# Patient Record
Sex: MALE | Race: BLACK OR AFRICAN AMERICAN | NOT HISPANIC OR LATINO | URBAN - METROPOLITAN AREA
[De-identification: names, ages, dates, MRNs, and addresses within clinical notes are randomized per-mention and may not be internally consistent; named-entity substitution may affect disease eponyms.]

---

## 2018-12-21 ENCOUNTER — EMERGENCY (EMERGENCY)
Facility: HOSPITAL | Age: 33
LOS: 0 days | Discharge: HOME | End: 2018-12-21
Admitting: PHYSICIAN ASSISTANT

## 2018-12-21 VITALS
TEMPERATURE: 97 F | SYSTOLIC BLOOD PRESSURE: 127 MMHG | DIASTOLIC BLOOD PRESSURE: 77 MMHG | HEART RATE: 78 BPM | OXYGEN SATURATION: 99 % | RESPIRATION RATE: 18 BRPM

## 2018-12-21 DIAGNOSIS — M25.522 PAIN IN LEFT ELBOW: ICD-10-CM

## 2018-12-21 DIAGNOSIS — Z79.2 LONG TERM (CURRENT) USE OF ANTIBIOTICS: ICD-10-CM

## 2018-12-21 DIAGNOSIS — N39.0 URINARY TRACT INFECTION, SITE NOT SPECIFIED: ICD-10-CM

## 2018-12-21 DIAGNOSIS — A64 UNSPECIFIED SEXUALLY TRANSMITTED DISEASE: ICD-10-CM

## 2018-12-21 RX ORDER — CEFTRIAXONE 500 MG/1
250 INJECTION, POWDER, FOR SOLUTION INTRAMUSCULAR; INTRAVENOUS ONCE
Qty: 0 | Refills: 0 | Status: COMPLETED | OUTPATIENT
Start: 2018-12-21 | End: 2018-12-21

## 2018-12-21 RX ORDER — AZITHROMYCIN 500 MG/1
1 TABLET, FILM COATED ORAL ONCE
Qty: 0 | Refills: 0 | Status: COMPLETED | OUTPATIENT
Start: 2018-12-21 | End: 2018-12-21

## 2018-12-21 RX ORDER — IBUPROFEN 200 MG
800 TABLET ORAL ONCE
Qty: 0 | Refills: 0 | Status: COMPLETED | OUTPATIENT
Start: 2018-12-21 | End: 2018-12-21

## 2018-12-21 RX ORDER — MOXIFLOXACIN HYDROCHLORIDE TABLETS, 400 MG 400 MG/1
1 TABLET, FILM COATED ORAL
Qty: 10 | Refills: 0
Start: 2018-12-21 | End: 2018-12-25

## 2018-12-21 RX ADMIN — AZITHROMYCIN 1 GRAM(S): 500 TABLET, FILM COATED ORAL at 20:45

## 2018-12-21 RX ADMIN — Medication 800 MILLIGRAM(S): at 20:45

## 2018-12-21 RX ADMIN — CEFTRIAXONE 250 MILLIGRAM(S): 500 INJECTION, POWDER, FOR SOLUTION INTRAMUSCULAR; INTRAVENOUS at 20:46

## 2018-12-21 NOTE — ED PROVIDER NOTE - OBJECTIVE STATEMENT
34 yo male, pmh of sti as an adolescent, presents to the ed for left elbow pain and tingling with urination. Pt reports elbow pain started six days ago, has not taken anything for pain, is worse with movement, denies trauma. Pt reports unprotected sex with female partner four days ago; states felt tingling with urine this morning. Denies fever, chills, cp, sob, visual changes, trauma, fall, nvd, abd pain, back pain, neck pain. 34 yo male, pmh of sti as an adolescent, presents to the ed for left elbow pain and tingling with urination.   Pt reports elbow pain started six days ago, has not taken anything for pain, is worse with movement, denies trauma.   Pt reports unprotected sex with female partner four days ago; states felt tingling with urine this morning.   Denies fever, chills, cp, sob, visual changes, trauma, fall, nvd, abd pain, back pain, neck pain.  Denies fever/chill/HA/dizziness/chest pain/palpitation/sob/abd pain/n/v/d/ black stool/bloody stool

## 2018-12-21 NOTE — ED PROVIDER NOTE - PROGRESS NOTE DETAILS
I supervised PA fellow care  STD testing benefits/limits discussed with patients.  Partner testing and treatment advised.  STDs may be present without symptoms d/w pt.  Early testing after exposure limits d/w pt.  Pt denies high-risk exposure in past 72 hours. Empiric tx offered/accepted. Pt aware of potentially infectious and agrees to no sex until all results known consistent with urinary tract infection.  no evidence of pyelo.  cx pending d/w pt.  pt aware needs immediate f/u if new or worse symptoms.  limits of abx dw pt. pt aware of need to return if back pain or fever.  pt aware of limitations of abx and potential need to change abx based on cx or new/worse sx.  potential s/e of meds explained : cdfiff/qtp/tendonitis/tendon rupture.  advised to d/c use if any arrythmia/chest pain/palpitations and return to ER   if any other intolerable s/e dc use and rto for further assesment. Recommneded taking probiotics Discussed rest, ice, compression, and elevation with patient.   Discussed NSAIDs for anti-inflammatory and pain relief.  Patient advised to f/u with ortho

## 2018-12-21 NOTE — ED PROVIDER NOTE - NSFOLLOWUPINSTRUCTIONS_ED_ALL_ED_FT
Urinary Tract Infection    A urinary tract infection (UTI) is an infection of any part of the urinary tract, which includes the kidneys, ureters, bladder, and urethra. Risk factors include ignoring your need to urinate, wiping back to front if female, being an uncircumcised male, and having diabetes or a weak immune system. Symptoms include frequent urination, pain or burning with urination, foul smelling urine, cloudy urine, pain in the lower abdomen, blood in the urine, and fever. If you were prescribed an antibiotic medicine, take it as told by your health care provider. Do not stop taking the antibiotic even if you start to feel better.    SEEK IMMEDIATE MEDICAL CARE IF YOU HAVE ANY OF THE FOLLOWING SYMPTOMS: severe back or abdominal pain, fever, inability to keep fluids or medicine down, dizziness/lightheadedness, or a change in mental status.     A sexually transmitted disease (STD) is a disease or infection that may be passed (transmitted) from person to person, usually during sexual activity. This may happen by way of saliva, semen, blood, vaginal mucus, or urine. Symptoms vary depending on the type of STD acquired and may include pain in the groin, discharge, and lesions or a rash. If you are started on an antibiotic, take it exactly as instructed. Avoid sexual contact of any kind until cleared by a health care professional. Contact your sexual partner(s) to inform them of your diagnosis so that they may be tested and treated as well.    SEEK IMMEDIATE MEDICAL CARE IF YOU HAVE ANY OF THE FOLLOWING SYMPTOMS: severe abdominal pain, high fever, nausea/vomiting, or unintended weight loss.

## 2018-12-21 NOTE — ED PROVIDER NOTE - NSFOLLOWUPCLINICS_GEN_ALL_ED_FT
[facility]  Urology  .  [place]   Phone: [phone]  Fax:   Follow Up Time:

## 2018-12-21 NOTE — ED PROVIDER NOTE - CARE PROVIDER_API CALL
Jeramie Lee (MD), Orthopaedic Surgery  [facility] [place] [address]  Phone: [phone]  Fax: [phone]

## 2018-12-21 NOTE — ED PROVIDER NOTE - NS ED ROS FT
Constitutional: (-) fever, (-) chills,  Eyes: (-) visual changes, (-) pain, (-) redness,    Cardiovascular: (-) chest pain, (-) syncope  Respiratory: (-) cough, (-) shortness of breath, (-) dyspnea,   Gastrointestinal: (-) vomiting, (-) diarrhea, (-)nausea  Musculoskeletal: (-) neck pain, (-) back pain, (+) left elbow pain  Integumentary: (-) rash,   Neurological: (-) headache,  Peripheral Vascular: (-) pain while walking, (-) leg swelling, (-) color changes  : (+) tingling with urination, (-)dysuria, (-) discharge/lesions Constitutional: (-) fever, (-) chills,  Eyes: (-) visual changes, (-) pain, (-) redness,    Cardiovascular: (-) chest pain, (-) syncope  Respiratory: (-) cough, (-) shortness of breath, (-) dyspnea,   Gastrointestinal: (-) vomiting, (-) diarrhea, (-)nausea  Musculoskeletal: (-) neck pain, (-) back pain, (+) left elbow pain  Integumentary: (-) rash,   Neurological: (-) headache,  Peripheral Vascular: (-) pain while walking, (-) leg swelling, (-) color changes  : (+) "tingling" with urination, (-)dysuria, (-) discharge/lesions

## 2018-12-21 NOTE — ED PROVIDER NOTE - PHYSICAL EXAMINATION
Physical Exam    Vital Signs: I have reviewed the initial vital signs.  Constitutional: well-nourished, appears stated age, no acute distress  Eyes: Conjunctiva pink, Sclera clear, PERRLA, EOMI.  Cardiovascular: S1 and S2, regular rate, regular rhythm, well-perfused extremities, radial pulses equal and 2+  Respiratory: unlabored respiratory effort, clear to auscultation bilaterally no wheezing, rales and rhonchi  Gastrointestinal: soft, non-tender abdomen, no pulsatile mass, normal bowl sounds, no cva ttp.  Musculoskeletal: supple neck, no lower extremity edema, no midline tenderness, left elbow slightly swollen with normal rom, no ttp over left elbow.   Integumentary: warm, dry, no rash  : Circumcised penis, no lesions, no discharge, testes palpated without pain, no varicocele/hydrocele, no ttp of epididymis.

## 2018-12-21 NOTE — ED ADULT TRIAGE NOTE - CHIEF COMPLAINT QUOTE
pt complaining of left arm pain and elbow pain denies trauma. pt requesting STD testing complaining upon burning upon urination unprotected sex 3 days ago.

## 2018-12-22 LAB
CULTURE RESULTS: SIGNIFICANT CHANGE UP
SPECIMEN SOURCE: SIGNIFICANT CHANGE UP

## 2018-12-24 LAB
C TRACH RRNA SPEC QL NAA+PROBE: SIGNIFICANT CHANGE UP
N GONORRHOEA RRNA SPEC QL NAA+PROBE: SIGNIFICANT CHANGE UP
SPECIMEN SOURCE: SIGNIFICANT CHANGE UP

## 2019-07-08 ENCOUNTER — EMERGENCY (EMERGENCY)
Facility: HOSPITAL | Age: 34
LOS: 0 days | Discharge: HOME | End: 2019-07-08
Attending: EMERGENCY MEDICINE | Admitting: EMERGENCY MEDICINE
Payer: MEDICAID

## 2019-07-08 VITALS
SYSTOLIC BLOOD PRESSURE: 114 MMHG | TEMPERATURE: 98 F | HEART RATE: 69 BPM | OXYGEN SATURATION: 99 % | RESPIRATION RATE: 16 BRPM | DIASTOLIC BLOOD PRESSURE: 77 MMHG
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
## 2019-07-08 DIAGNOSIS — Z79.891 LONG TERM (CURRENT) USE OF OPIATE ANALGESIC: ICD-10-CM

## 2019-07-08 DIAGNOSIS — M79.671 PAIN IN RIGHT FOOT: ICD-10-CM

## 2019-07-08 DIAGNOSIS — M25.561 PAIN IN RIGHT KNEE: ICD-10-CM
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
## 2019-07-08 PROCEDURE — 73564 X-RAY EXAM KNEE 4 OR MORE: CPT | Mod: 26,RT

## 2019-07-08 PROCEDURE — 73610 X-RAY EXAM OF ANKLE: CPT | Mod: 26,RT

## 2019-07-08 PROCEDURE — 99283 EMERGENCY DEPT VISIT LOW MDM: CPT

## 2019-07-08 PROCEDURE — 73630 X-RAY EXAM OF FOOT: CPT | Mod: 26,RT

## 2019-07-08 RX ORDER — IBUPROFEN 200 MG
600 TABLET ORAL ONCE
Refills: 0 | Status: COMPLETED | OUTPATIENT
Start: 2019-07-08 | End: 2019-07-08

## 2019-07-08 RX ADMIN — Medication 600 MILLIGRAM(S): at 13:49

## 2019-07-08 NOTE — ED PROVIDER NOTE - CARE PROVIDER_API CALL
Evan Granda)  Orthopaedic Surgery  [address] [place] [address]  Phone: [phone]  Fax: [phone]  Follow Up Time: 1-3 Days
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
## 2019-07-08 NOTE — ED PROVIDER NOTE - NS ED ROS FT
General: No fever, chills.  MS:  Right foot pain. right knee pain. Right foot pain with ambulation.   Neuro:  No numbness or parasthesias of RLE. No weakness of RLE.  Skin:  No skin rash.

## 2019-08-11 ENCOUNTER — EMERGENCY (EMERGENCY)
Facility: HOSPITAL | Age: 34
LOS: 0 days | Discharge: HOME | End: 2019-08-11
Admitting: EMERGENCY MEDICINE
Payer: MEDICAID

## 2019-08-11 VITALS
OXYGEN SATURATION: 99 % | RESPIRATION RATE: 20 BRPM | HEART RATE: 62 BPM | SYSTOLIC BLOOD PRESSURE: 117 MMHG | TEMPERATURE: 97 F | DIASTOLIC BLOOD PRESSURE: 79 MMHG

## 2019-08-11 DIAGNOSIS — M25.569 PAIN IN UNSPECIFIED KNEE: ICD-10-CM
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
## 2019-08-11 DIAGNOSIS — M25.561 PAIN IN RIGHT KNEE: ICD-10-CM
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
## 2019-08-11 DIAGNOSIS — M54.5 LOW BACK PAIN: ICD-10-CM
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
## 2019-08-11 DIAGNOSIS — M79.661 PAIN IN RIGHT LOWER LEG: ICD-10-CM
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
## 2019-08-11 PROCEDURE — 99283 EMERGENCY DEPT VISIT LOW MDM: CPT

## 2019-08-11 RX ORDER — CYCLOBENZAPRINE HYDROCHLORIDE 10 MG/1
1 TABLET, FILM COATED ORAL
Qty: 15 | Refills: 0
Start: 2019-08-11 | End: 2019-08-15

## 2019-08-11 RX ORDER — KETOROLAC TROMETHAMINE 30 MG/ML
60 SYRINGE (ML) INJECTION ONCE
Refills: 0 | Status: DISCONTINUED | OUTPATIENT
Start: 2019-08-11 | End: 2019-08-11

## 2019-08-11 RX ORDER — METHOCARBAMOL 500 MG/1
1000 TABLET, FILM COATED ORAL ONCE
Refills: 0 | Status: COMPLETED | OUTPATIENT
Start: 2019-08-11 | End: 2019-08-11

## 2019-08-11 RX ADMIN — Medication 60 MILLIGRAM(S): at 11:43

## 2019-08-11 RX ADMIN — METHOCARBAMOL 1000 MILLIGRAM(S): 500 TABLET, FILM COATED ORAL at 11:43

## 2019-08-11 NOTE — ED PROVIDER NOTE - PHYSICAL EXAMINATION
CONST: Well appearing in NAD  EYES: PERRL, EOMI, Sclera and conjunctiva clear.   NECK: Non-tender, no meningeal signs  CARD: Normal S1 S2; Normal rate and rhythm  RESP: Equal BS B/L, No wheezes, rhonchi or rales. No distress  GI: Soft, non-tender, non-distended.  MS: Normal ROM in all extremities. No midline spinal tenderness. Minimal medial right ankle tenderness and right knee pain on flexion past 90 degrees. No redness or swelling or laxity. NV intact distally  SKIN: Warm, dry, no acute rashes. Good turgor  NEURO: A&Ox3, No focal deficits. Strength 5/5 with no sensory deficits. Gait with crutches

## 2019-08-11 NOTE — ED PROVIDER NOTE - NSFOLLOWUPINSTRUCTIONS_ED_ALL_ED_FT
Back Pain    Back pain is very common in adults. The cause of back pain is rarely dangerous and the pain often gets better over time. The cause of your back pain may not be known and may include strain of muscles or ligaments, degeneration of the spinal disks, or arthritis. Occasionally the pain may radiate down your leg(s). Over-the-counter medicines to reduce pain and inflammation are often the most helpful. Stretching and remaining active frequently helps the healing process.     SEEK IMMEDIATE MEDICAL CARE IF YOU HAVE ANY OF THE FOLLOWING SYMPTOMS: bowel or bladder control problems, unusual weakness or numbness in your arms or legs, nausea or vomiting, abdominal pain, fever, dizziness/lightheadedness.  Chronic Pain    Chronic pain is a complex condition and the Emergency Department is not the ideal place to manage this condition. Prescription painkillers must be written by your primary care provider or a pain management physician. Avoid activities or triggers that exacerbate your pain.    SEEK IMMEDIATE MEDICAL CARE IF YOU HAVE ANY OF THE FOLLOWING SYMPTOMS: severe chest pain, fainting, vomiting blood, dark or bloody stools, or pain different from your chronic pain.

## 2019-08-11 NOTE — ED PROVIDER NOTE - NSFOLLOWUPCLINICS_GEN_ALL_ED_FT
[facility]  Orthpedic  [address], [place]   Phone: [phone]  Fax:   Follow Up Time: 4-6 Days

## 2019-08-11 NOTE — ED PROVIDER NOTE - CLINICAL SUMMARY MEDICAL DECISION MAKING FREE TEXT BOX
PT with RLE joint pains and also left lower back pain. No signs of infx. No meds taken for pain. Never FU from previous visit. Will give NSAIDS and MRs and advise need to FU with ortho for further eval. I have discussed the discharge plan with the patient. The patient agrees with the plan, as discussed.  The patient understands Emergency Department diagnosis is a preliminary diagnosis often based on limited information and that the patient must adhere to the follow-up plan as discussed.  The patient understands that if the symptoms worsen or if prescribed medications do not have the desired/planned effect that the patient may return to the Emergency Department at any time for further evaluation and treatment.

## 2019-08-11 NOTE — ED ADULT NURSE NOTE - OBJECTIVE STATEMENT
pt comes in with complaints of b/l knee pain that is going up his back. was seen here 2 weeks ago. given crutches and took naproxyen. states medication didn't help. did not follow up with a doctor.

## 2019-08-11 NOTE — ED PROVIDER NOTE - OBJECTIVE STATEMENT
Pt has constant moderate ache pain to right knee and ankle x 3 weeks. No known trauma. No fever or insect bite.Seen here  3 weeks ago and had xrays of right ankle and knee and were negative. Pt never FU with ortho. Pt has been using crutches and last night stumbled and twisted back. No weakness or numbness

## 2019-08-11 NOTE — ED ADULT NURSE NOTE - CHIEF COMPLAINT QUOTE
BIBA c'o B/L knee pain was seen in ED two weeks prior had x-ray done with negative never followed up with ortho

## 2019-08-14 PROBLEM — Z00.00 ENCOUNTER FOR PREVENTIVE HEALTH EXAMINATION: Status: ACTIVE | Noted: 2019-08-14

## 2019-08-21 ENCOUNTER — OUTPATIENT (OUTPATIENT)
Dept: OUTPATIENT SERVICES | Facility: HOSPITAL | Age: 34
LOS: 1 days | Discharge: HOME | End: 2019-08-21

## 2019-08-21 ENCOUNTER — APPOINTMENT (OUTPATIENT)
Dept: ORTHOPEDIC SURGERY | Facility: CLINIC | Age: 34
End: 2019-08-21

## 2019-08-21 DIAGNOSIS — M25.561 PAIN IN RIGHT KNEE: ICD-10-CM

## 2019-08-22 ENCOUNTER — APPOINTMENT (OUTPATIENT)
Dept: RHEUMATOLOGY | Facility: CLINIC | Age: 34
End: 2019-08-22

## 2019-08-22 ENCOUNTER — LABORATORY RESULT (OUTPATIENT)
Age: 34
End: 2019-08-22

## 2019-08-22 ENCOUNTER — OUTPATIENT (OUTPATIENT)
Dept: OUTPATIENT SERVICES | Facility: HOSPITAL | Age: 34
LOS: 1 days | Discharge: HOME | End: 2019-08-22

## 2019-08-22 VITALS
WEIGHT: 159 LBS | DIASTOLIC BLOOD PRESSURE: 68 MMHG | HEIGHT: 72 IN | HEART RATE: 46 BPM | BODY MASS INDEX: 21.54 KG/M2 | TEMPERATURE: 97.6 F | SYSTOLIC BLOOD PRESSURE: 105 MMHG

## 2019-08-22 LAB — ERYTHROCYTE [SEDIMENTATION RATE] IN BLOOD BY WESTERGREN METHOD: 41 MM/HR

## 2019-08-22 NOTE — END OF VISIT
[] : Resident [FreeTextEntry3] : 32 yo AA male with onset of diffuse arthralgias ongoing for approx one year, worsened over the last 2 months. C/O am stiffness lasting up 2 hours, resolves with 2 advils each morning.  has not been able to go to work as a result. knees and ankles main affected jts. used to have mild pain over R lateral epicondyle but resolved with time on own.  On PE - no synovitis over jts and full ROM, though dose have TTP over R ankle.  Denies sores, hair loss, red/ hot/ swollen jts, dactylitis, enthesitis, uveitis, IBD, lower back pain.  NO known fam hx of CTD. \par - will check labs now to eval for inflamm arthritis\par - xrays unremarkable \par - cont ibuprofen PRN\par return in 2 weeks

## 2019-08-22 NOTE — HISTORY OF PRESENT ILLNESS
[FreeTextEntry1] : 32 yo M with no significant PMH was sent to Rheum clinic by Ortho because of pain in multiple joints. Patient is complaining of pain in b/l knees, and R ankle. States it is debilitating, has been using crutches for past ~2 months, and has not been able to go to work. States it is worse in morning, reports stiffness as well, and feels relief with OTC Advil (takes 2 caps in AM). Denies any recent trauma or injury. No rashes or vision complaints, no SOB or back pain, N/V/C/D, or other issues. Patient also complaining of some discomfort to R elbow but states it is much less so. Reports he has had some issues with similar pain last year, resolved on own, and was not as severe. No known family history of autoimmune disorders. Does report multiple elderly members used to complain about arthritic pain.

## 2019-08-22 NOTE — PHYSICAL EXAM
[General Appearance - Alert] : alert [General Appearance - In No Acute Distress] : in no acute distress [Nail Clubbing] : no clubbing  or cyanosis of the fingernails [Musculoskeletal - Swelling] : no joint swelling seen [Skin Color & Pigmentation] : normal skin color and pigmentation [Skin Turgor] : normal skin turgor [PERRL With Normal Accommodation] : pupils were equal in size, round, and reactive to light [Outer Ear] : the ears and nose were normal in appearance [Neck Appearance] : the appearance of the neck was normal [] : no respiratory distress [Heart Sounds] : normal S1 and S2 [Bowel Sounds] : normal bowel sounds [FreeTextEntry1] : significant tenderness to palpation over inferior aspect of patella b/l knees and also over medial aspect of R ankle [Oriented To Time, Place, And Person] : oriented to person, place, and time

## 2019-08-22 NOTE — ASSESSMENT
[FreeTextEntry1] : #) Joint pain\par - b/l knees + R ankle with significant pain\par - XR's of above negative\par - will check labs - MEENA, RF, ESR, CRP, anti-CCP, Hep B/C\par - Rx c/w OTC Advil PRN\par - RTC in 3 weeks

## 2019-08-23 LAB
CRP SERPL-MCNC: 0.26 MG/DL
HAV IGM SER QL: NONREACTIVE
HBV CORE IGM SER QL: NONREACTIVE
HBV SURFACE AG SER QL: NONREACTIVE
HCV AB SER QL: ABNORMAL
HCV S/CO RATIO: 1.84 S/CO
RHEUMATOID FACT SER QL: <10 IU/ML

## 2019-08-25 LAB
ANA SER IF-ACNC: NEGATIVE
CCP AB SER IA-ACNC: <8 UNITS
RF+CCP IGG SER-IMP: NEGATIVE

## 2019-09-26 ENCOUNTER — APPOINTMENT (OUTPATIENT)
Dept: RHEUMATOLOGY | Facility: CLINIC | Age: 34
End: 2019-09-26

## 2019-10-10 ENCOUNTER — APPOINTMENT (OUTPATIENT)
Dept: RHEUMATOLOGY | Facility: CLINIC | Age: 34
End: 2019-10-10

## 2019-12-28 ENCOUNTER — EMERGENCY (EMERGENCY)
Facility: HOSPITAL | Age: 34
LOS: 0 days | Discharge: HOME | End: 2019-12-28
Attending: EMERGENCY MEDICINE | Admitting: EMERGENCY MEDICINE
Payer: MEDICAID

## 2019-12-28 VITALS
TEMPERATURE: 98 F | OXYGEN SATURATION: 99 % | DIASTOLIC BLOOD PRESSURE: 75 MMHG | RESPIRATION RATE: 17 BRPM | SYSTOLIC BLOOD PRESSURE: 114 MMHG

## 2019-12-28 VITALS
RESPIRATION RATE: 18 BRPM | OXYGEN SATURATION: 97 % | SYSTOLIC BLOOD PRESSURE: 133 MMHG | HEART RATE: 74 BPM | DIASTOLIC BLOOD PRESSURE: 89 MMHG | TEMPERATURE: 98 F

## 2019-12-28 DIAGNOSIS — M25.462 EFFUSION, LEFT KNEE: ICD-10-CM

## 2019-12-28 DIAGNOSIS — M25.561 PAIN IN RIGHT KNEE: ICD-10-CM

## 2019-12-28 DIAGNOSIS — Z88.8 ALLERGY STATUS TO OTHER DRUGS, MEDICAMENTS AND BIOLOGICAL SUBSTANCES STATUS: ICD-10-CM

## 2019-12-28 DIAGNOSIS — M25.572 PAIN IN LEFT ANKLE AND JOINTS OF LEFT FOOT: ICD-10-CM

## 2019-12-28 DIAGNOSIS — Z00.00 ENCOUNTER FOR GENERAL ADULT MEDICAL EXAMINATION WITHOUT ABNORMAL FINDINGS: ICD-10-CM

## 2019-12-28 DIAGNOSIS — M25.562 PAIN IN LEFT KNEE: ICD-10-CM

## 2019-12-28 LAB
ALBUMIN SERPL ELPH-MCNC: 4.2 G/DL — SIGNIFICANT CHANGE UP (ref 3.5–5.2)
ALP SERPL-CCNC: 84 U/L — SIGNIFICANT CHANGE UP (ref 30–115)
ALT FLD-CCNC: 6 U/L — SIGNIFICANT CHANGE UP (ref 0–41)
ANION GAP SERPL CALC-SCNC: 13 MMOL/L — SIGNIFICANT CHANGE UP (ref 7–14)
AST SERPL-CCNC: 17 U/L — SIGNIFICANT CHANGE UP (ref 0–41)
BASOPHILS # BLD AUTO: 0.02 K/UL — SIGNIFICANT CHANGE UP (ref 0–0.2)
BASOPHILS NFR BLD AUTO: 0.3 % — SIGNIFICANT CHANGE UP (ref 0–1)
BILIRUB SERPL-MCNC: 0.4 MG/DL — SIGNIFICANT CHANGE UP (ref 0.2–1.2)
BUN SERPL-MCNC: 7 MG/DL — LOW (ref 10–20)
CALCIUM SERPL-MCNC: 9.6 MG/DL — SIGNIFICANT CHANGE UP (ref 8.5–10.1)
CHLORIDE SERPL-SCNC: 98 MMOL/L — SIGNIFICANT CHANGE UP (ref 98–110)
CO2 SERPL-SCNC: 27 MMOL/L — SIGNIFICANT CHANGE UP (ref 17–32)
CREAT SERPL-MCNC: 0.8 MG/DL — SIGNIFICANT CHANGE UP (ref 0.7–1.5)
EOSINOPHIL # BLD AUTO: 0.06 K/UL — SIGNIFICANT CHANGE UP (ref 0–0.7)
EOSINOPHIL NFR BLD AUTO: 0.8 % — SIGNIFICANT CHANGE UP (ref 0–8)
GLUCOSE SERPL-MCNC: 102 MG/DL — HIGH (ref 70–99)
HCT VFR BLD CALC: 42 % — SIGNIFICANT CHANGE UP (ref 42–52)
HGB BLD-MCNC: 14.1 G/DL — SIGNIFICANT CHANGE UP (ref 14–18)
IMM GRANULOCYTES NFR BLD AUTO: 0.4 % — HIGH (ref 0.1–0.3)
LYMPHOCYTES # BLD AUTO: 2.23 K/UL — SIGNIFICANT CHANGE UP (ref 1.2–3.4)
LYMPHOCYTES # BLD AUTO: 29.2 % — SIGNIFICANT CHANGE UP (ref 20.5–51.1)
MCHC RBC-ENTMCNC: 30.7 PG — SIGNIFICANT CHANGE UP (ref 27–31)
MCHC RBC-ENTMCNC: 33.6 G/DL — SIGNIFICANT CHANGE UP (ref 32–37)
MCV RBC AUTO: 91.5 FL — SIGNIFICANT CHANGE UP (ref 80–94)
MONOCYTES # BLD AUTO: 0.86 K/UL — HIGH (ref 0.1–0.6)
MONOCYTES NFR BLD AUTO: 11.2 % — HIGH (ref 1.7–9.3)
NEUTROPHILS # BLD AUTO: 4.45 K/UL — SIGNIFICANT CHANGE UP (ref 1.4–6.5)
NEUTROPHILS NFR BLD AUTO: 58.1 % — SIGNIFICANT CHANGE UP (ref 42.2–75.2)
NRBC # BLD: 0 /100 WBCS — SIGNIFICANT CHANGE UP (ref 0–0)
PLATELET # BLD AUTO: 256 K/UL — SIGNIFICANT CHANGE UP (ref 130–400)
POTASSIUM SERPL-MCNC: 4.4 MMOL/L — SIGNIFICANT CHANGE UP (ref 3.5–5)
POTASSIUM SERPL-SCNC: 4.4 MMOL/L — SIGNIFICANT CHANGE UP (ref 3.5–5)
PROT SERPL-MCNC: 8.4 G/DL — HIGH (ref 6–8)
RBC # BLD: 4.59 M/UL — LOW (ref 4.7–6.1)
RBC # FLD: 12.4 % — SIGNIFICANT CHANGE UP (ref 11.5–14.5)
SODIUM SERPL-SCNC: 138 MMOL/L — SIGNIFICANT CHANGE UP (ref 135–146)
WBC # BLD: 7.65 K/UL — SIGNIFICANT CHANGE UP (ref 4.8–10.8)
WBC # FLD AUTO: 7.65 K/UL — SIGNIFICANT CHANGE UP (ref 4.8–10.8)

## 2019-12-28 PROCEDURE — 99284 EMERGENCY DEPT VISIT MOD MDM: CPT

## 2019-12-28 PROCEDURE — 73600 X-RAY EXAM OF ANKLE: CPT | Mod: 26,LT

## 2019-12-28 PROCEDURE — 73562 X-RAY EXAM OF KNEE 3: CPT | Mod: 26,LT,RT

## 2019-12-28 RX ORDER — MORPHINE SULFATE 50 MG/1
4 CAPSULE, EXTENDED RELEASE ORAL ONCE
Refills: 0 | Status: DISCONTINUED | OUTPATIENT
Start: 2019-12-28 | End: 2019-12-28

## 2019-12-28 RX ORDER — SODIUM CHLORIDE 9 MG/ML
1000 INJECTION INTRAMUSCULAR; INTRAVENOUS; SUBCUTANEOUS ONCE
Refills: 0 | Status: COMPLETED | OUTPATIENT
Start: 2019-12-28 | End: 2019-12-28

## 2019-12-28 RX ADMIN — SODIUM CHLORIDE 1000 MILLILITER(S): 9 INJECTION INTRAMUSCULAR; INTRAVENOUS; SUBCUTANEOUS at 16:12

## 2019-12-28 RX ADMIN — MORPHINE SULFATE 4 MILLIGRAM(S): 50 CAPSULE, EXTENDED RELEASE ORAL at 14:38

## 2019-12-28 RX ADMIN — Medication 60 MILLIGRAM(S): at 14:38

## 2019-12-28 RX ADMIN — SODIUM CHLORIDE 2000 MILLILITER(S): 9 INJECTION INTRAMUSCULAR; INTRAVENOUS; SUBCUTANEOUS at 14:39

## 2019-12-28 NOTE — ED PROVIDER NOTE - OBJECTIVE STATEMENT
35 yo male presented c/o bilateral knee pain and left ankle pain x 6 months, worse over the past day. Pt stated he has been having intermittent swelling and sometimes prevents him from ambulating. Pt seen in ED in past given crutches and states sx improved and now with sx again preventing normal ambulation over past few days. Denies any trauma, no fevers, chills, weakness, myalgias, cough, SOB, CP or URI sx. No rashes, urinary complaints, paresthesias or HA. Pt stated he did not follow up with rheumatology as of yet. Pt taking Tylenol as needed and had allergic reaction reported as significant lip swelling ~1 week ago. Used ibuprofen with similar reaction. Denied any previous allergic reaction or known allergies; did not have any V/D, wheezing, cough or rash at time, only lip swelling which appeared in pictures to be angioedema.

## 2019-12-28 NOTE — ED PROVIDER NOTE - PHYSICAL EXAMINATION
VITAL SIGNS: noted  CONSTITUTIONAL: Well-developed; well-nourished; in no acute distress  HEAD: Normocephalic; atraumatic  EYES: PERRL, EOM intact; conjunctiva and sclera clear  ENT: No nasal discharge; airway clear. MMM  NECK: Supple; non tender. No anterior cervical lymphadenopathy noted  CARD: S1, S2 normal; no murmurs, gallops, or rubs. Regular rate and rhythm  RESP: CTAB/L, no wheezes, rales or rhonchi  ABD: Normal bowel sounds; soft; non-distended; non-tender; no hepatosplenomegaly. No CVA tenderness  EXT: Unable to range LEs due to knee pain, + point tenderness bilateral knees with swelling to left knee, left knee with mild swelling bilateral malleoli, no warmth or erythema. No calf tenderness or edema. Distal pulses intact  NEURO: Alert, oriented. Grossly unremarkable. No focal deficits  SKIN: Skin exam is warm and dry  MS: No midline spinal tenderness

## 2019-12-28 NOTE — ED PROVIDER NOTE - CLINICAL SUMMARY MEDICAL DECISION MAKING FREE TEXT BOX
pt labs, imaging negative, suspect autoimmune issue although unclear what causing sx and previous allergic reactions/angioedema in past. Pt offered admission for further workup but declined stating he felt comfortable schedule follow up and referral for rheum given. Also advised PMD follow up and need for allergy testing given reactions. Pt started on short course prednisone, rx sent. Discussed medicines related to meds he had allergy to and cautioned to avoid entire drug category given lip swelling. Strict return precautions advised and pt verbalized understanding.

## 2019-12-28 NOTE — ED PROVIDER NOTE - NSFOLLOWUPCLINICS_GEN_ALL_ED_FT
[facility]  Medicine  [address], [place]   Phone: [phone]  Fax:   Follow Up Time: 1-3 Days

## 2019-12-28 NOTE — ED ADULT NURSE NOTE - OBJECTIVE STATEMENT
patient states was here in august for right ankle pain, and b/l knee pain, states has this pain and does not f/u out patient states cant take Tylenol Motrin or naproxen for 1 month due to having allergic reaction. states unable to walk now due to pain " its my joints"

## 2019-12-28 NOTE — ED PROVIDER NOTE - CARE PROVIDER_API CALL
Paris Shirley)  Rheumatology  [address] [place] [address]  Phone: [phone]  Fax: [phone]  Follow Up Time: 1-3 Days

## 2019-12-28 NOTE — ED PROVIDER NOTE - NSFOLLOWUPINSTRUCTIONS_ED_ALL_ED_FT
FOLLOW UP WITH YOUR DOCTOR THIS WEEK FOR REEVALUATION AND FURTHER TESTING. ADVISE RHEUMATOLOGY FOLLOW UP AS WELL.    RETURN TO ED IMMEDIATELY WITH ANY WORSENING SYMPTOMS, CHEST PAIN, SHORTNESS OF BREATH, FEVERS, WEAKNESS, DIZZINESS, PERSISTENT OR SEVERE HEADACHE OR ANY OTHER CONCERNS.    Joint Pain    Joint pain, which is also called arthralgia, can be caused by many things. Joint pain often goes away when you follow your health care provider's instructions for relieving pain at home. However, joint pain can also be caused by conditions that require further treatment. Common causes of joint pain include:    Bruising in the area of the joint.  Overuse of the joint.  Wear and tear on the joints that occur with aging (osteoarthritis).  Various other forms of arthritis.  A buildup of a crystal form of uric acid in the joint (gout).  Infections of the joint (septic arthritis) or of the bone (osteomyelitis).    Your health care provider may recommend medicine to help with the pain. If your joint pain continues, additional tests may be needed to diagnose your condition.    HOME CARE INSTRUCTIONS  Watch your condition for any changes. Follow these instructions as directed to lessen the pain that you are feeling.    Take medicines only as directed by your health care provider.  Rest the affected area for as long as your health care provider says that you should. If directed to do so, raise the painful joint above the level of your heart while you are sitting or lying down.  Do not do things that cause or worsen pain.  If directed, apply ice to the painful area:  Put ice in a plastic bag.  Place a towel between your skin and the bag.  Leave the ice on for 20 minutes, 2–3 times per day.  Wear an elastic bandage, splint, or sling as directed by your health care provider. Loosen the elastic bandage or splint if your fingers or toes become numb and tingle, or if they turn cold and blue.  Begin exercising or stretching the affected area as directed by your health care provider. Ask your health care provider what types of exercise are safe for you.  Keep all follow-up visits as directed by your health care provider. This is important.    SEEK MEDICAL CARE IF:  Your pain increases, and medicine does not help.  Your joint pain does not improve within 3 days.  You have increased bruising or swelling.  You have a fever.  You lose 10 lb (4.5 kg) or more without trying.    SEEK IMMEDIATE MEDICAL CARE IF:  You are not able to move the joint.   Your fingers or toes become numb or they turn cold and blue.    ADDITIONAL NOTES AND INSTRUCTIONS    Please follow up with your Primary MD in 24-48 hr.  Seek immediate medical care for any new/worsening signs or symptoms.

## 2019-12-28 NOTE — ED PROVIDER NOTE - PATIENT PORTAL LINK FT
You can access the FollowMyHealth Patient Portal offered by [facility] by registering at the following website: [email or web address]. By joining [employer]’s FollowMyHealth portal, you will also be able to view your health information using other applications (apps) compatible with our system.

## 2019-12-28 NOTE — ED PROVIDER NOTE - NS ED ROS FT
Constitutional: see HPI  Eyes:  No visual changes, eye pain or discharge.  ENMT:  No hearing changes, pain, discharge or infections. No neck pain or stiffness.  Cardiac:  No chest pain, SOB or edema. No chest pain with exertion.  Respiratory:  No cough or respiratory distress. No hemoptysis. No history of asthma or RAD.  GI:  No nausea, vomiting, diarrhea or abdominal pain.  :  No dysuria, frequency or burning.  MS:  No myalgia, muscle weakness, or back pain.  Neuro:  No headache or weakness.  No LOC.  Skin:  No skin rash.   Endocrine: No history of thyroid disease or diabetes.

## 2020-02-13 ENCOUNTER — APPOINTMENT (OUTPATIENT)
Dept: RHEUMATOLOGY | Facility: CLINIC | Age: 35
End: 2020-02-13
Payer: MEDICAID

## 2020-02-13 ENCOUNTER — OUTPATIENT (OUTPATIENT)
Dept: OUTPATIENT SERVICES | Facility: HOSPITAL | Age: 35
LOS: 1 days | Discharge: HOME | End: 2020-02-13

## 2020-02-13 VITALS
DIASTOLIC BLOOD PRESSURE: 78 MMHG | HEIGHT: 72 IN | BODY MASS INDEX: 21.67 KG/M2 | SYSTOLIC BLOOD PRESSURE: 124 MMHG | HEART RATE: 72 BPM | TEMPERATURE: 97.4 F | WEIGHT: 160 LBS

## 2020-02-13 PROCEDURE — 99214 OFFICE O/P EST MOD 30 MIN: CPT | Mod: GC

## 2020-02-13 NOTE — END OF VISIT
[] : Resident [FreeTextEntry3] : 35 yo M presenting for follow up of oligoarticular arthritis associated with severe pain throughout the day requiring use of crutches ongoing since last summer 2019. Reports hx of prolonged AM stiffness, joint swelling worse off steroids.  At initial presentation joints involved included knees, R ankle - since that time R ankle pain/swelling resolved, now with knee and L ankle/midfoot involvement by history. Extensive serologic workup unrevealing except for significantly elevated inflammatory markers, neg ab/infectious screening. Had tried NSAIDs but had lip swelling with NSAID use. PCP placed pt on steroids - symptoms improved on pred 20 mg daily but worsen when he tries to taper to 10 mg daily - advised by another provider that he may have gout. Exam today limited in the setting of ongoing steroid use - only with mild swelling at L ankle but no significant swelling at knees. No SI discomfort, no hx inflammatory eye disease, no symptoms of IBD, no psoriasis, no dactylitis or enthesitis. No significant family history. Pt with infrequent EtOH use that he's not sure he could consistently avoid, and he uses daily marijuana. Given distribution of suspected peripheral inflammatory arthritis in oligoarticular pattern responsive to steroids would consider spondyloarthropathy. No axial symptoms, pt prefers not to screen SI xrays. May obtain MRI L ankle/foot. May continue prednisone but would decrease to lowest dose which provides symptom improvement - try pred 15 mg daily. Unable to take NSAIDs. Update labs as ordered. PT referral. If remainder of workup is c/w possible peripheral spondyloarthropathy would consider MTX if able to avoid EtOH (HCV screen weak pos but confirmation testing neg, HBV neg) vs SSZ or LEF. Follow up 2 months.

## 2020-02-13 NOTE — PHYSICAL EXAM
[General Appearance - Alert] : alert [General Appearance - In No Acute Distress] : in no acute distress [Sclera] : the sclera and conjunctiva were normal [Hearing Threshold Finger Rub Not [place]] : hearing was normal [Neck Appearance] : the appearance of the neck was normal [Respiration, Rhythm And Depth] : normal respiratory rhythm and effort [Auscultation Breath Sounds / Voice Sounds] : lungs were clear to auscultation bilaterally [Abdomen Soft] : soft [Abdomen Tenderness] : non-tender [Skin Color & Pigmentation] : normal skin color and pigmentation [Oriented To Time, Place, And Person] : oriented to person, place, and time [No CVA Tenderness] : no ~M costovertebral angle tenderness [No Spinal Tenderness] : no spinal tenderness [Heart Rate And Rhythm] : heart rate was normal and rhythm regular [] : no rash [FreeTextEntry1] : Normal muscle bulk/tone

## 2020-02-13 NOTE — ASSESSMENT
[FreeTextEntry1] : 35 yo M with no significant PMHx presents with multiple joint pain. \par \par #) Joint pain \par - b/l knees + L ankle with significant pain off steroids \par - pain is better today since he is still taking steroids \par - XR's of above negative\par - labs - all negative except  MEENA, RF, CRP, anti-CCP, Hep B/C except ESR elevated, Hep C weakly reactive \par - other labwork reviewed - GC/chylmadia negative, uric acid WNL.  \par - patient went to PCP who diagnosed him with gout and started on prednisone taper. patient reports relief with 20 of prednisone but not with 10mg. \par - patient has angioedema of lips with NSAIDs - avoid NSAIDs \par - discussed trial of methotrexate but patient will continue to drink alcohol so will hold off on prescribing for now\par - referred for physical therapy \par - follow up MRI of L ankle and foot \par - follow up lab work - \par  \par - RTC in

## 2020-02-13 NOTE — REVIEW OF SYSTEMS
[Joint Pain] : joint pain [Joint Swelling] : joint swelling [Negative] : Heme/Lymph [Fever] : no fever [Chills] : no chills [Eye Pain] : no eye pain [Red Eyes] : eyes not red [Sore Throat] : no sore throat [Shortness Of Breath] : no shortness of breath [Cough] : no cough [Abdominal Pain] : no abdominal pain [Vomiting] : no vomiting [Constipation] : no constipation [Diarrhea] : no diarrhea [As Noted in HPI] : as noted in HPI [Skin Lesions] : no skin lesions [Confused] : no confusion [Dizziness] : no dizziness [de-identified] : +swollen lips

## 2020-02-13 NOTE — HISTORY OF PRESENT ILLNESS
[FreeTextEntry1] : 32 yo M with no significant PMH presents to rheum clinic for pain in multiple joints. Patient is complaining of pain in b/l knees, and L ankle. States it is debilitating, has been using crutches for past ~2 months, and has not been able to go to work. States it is worse in morning, reports stiffness as well, and feels relief with prednisone. \par \par Patient complains about a diagnosis of gout by his PCP and was started on a prednisone taper. \par  \par No eye problems, psoriasis, family history of autoimmune diseases.

## 2020-03-03 DIAGNOSIS — M19.90 UNSPECIFIED OSTEOARTHRITIS, UNSPECIFIED SITE: ICD-10-CM

## 2020-05-14 ENCOUNTER — APPOINTMENT (OUTPATIENT)
Dept: RHEUMATOLOGY | Facility: CLINIC | Age: 35
End: 2020-05-14

## 2020-08-12 ENCOUNTER — INPATIENT (INPATIENT)
Facility: HOSPITAL | Age: 35
LOS: 1 days | Discharge: HOME | End: 2020-08-14
Attending: HOSPITALIST | Admitting: HOSPITALIST
Payer: MEDICAID

## 2020-08-12 VITALS
DIASTOLIC BLOOD PRESSURE: 89 MMHG | HEART RATE: 77 BPM | RESPIRATION RATE: 17 BRPM | OXYGEN SATURATION: 99 % | TEMPERATURE: 98 F | SYSTOLIC BLOOD PRESSURE: 130 MMHG

## 2020-08-12 LAB
ALBUMIN SERPL ELPH-MCNC: 4.1 G/DL — SIGNIFICANT CHANGE UP (ref 3.5–5.2)
ALP SERPL-CCNC: 78 U/L — SIGNIFICANT CHANGE UP (ref 30–115)
ALT FLD-CCNC: 7 U/L — SIGNIFICANT CHANGE UP (ref 0–41)
ANION GAP SERPL CALC-SCNC: 14 MMOL/L — SIGNIFICANT CHANGE UP (ref 7–14)
APPEARANCE UR: CLEAR — SIGNIFICANT CHANGE UP
AST SERPL-CCNC: 16 U/L — SIGNIFICANT CHANGE UP (ref 0–41)
BASOPHILS # BLD AUTO: 0.03 K/UL — SIGNIFICANT CHANGE UP (ref 0–0.2)
BASOPHILS NFR BLD AUTO: 0.4 % — SIGNIFICANT CHANGE UP (ref 0–1)
BILIRUB SERPL-MCNC: 0.3 MG/DL — SIGNIFICANT CHANGE UP (ref 0.2–1.2)
BILIRUB UR-MCNC: NEGATIVE — SIGNIFICANT CHANGE UP
BUN SERPL-MCNC: 8 MG/DL — LOW (ref 10–20)
CALCIUM SERPL-MCNC: 9.7 MG/DL — SIGNIFICANT CHANGE UP (ref 8.5–10.1)
CHLORIDE SERPL-SCNC: 100 MMOL/L — SIGNIFICANT CHANGE UP (ref 98–110)
CO2 SERPL-SCNC: 24 MMOL/L — SIGNIFICANT CHANGE UP (ref 17–32)
COLOR SPEC: SIGNIFICANT CHANGE UP
CREAT SERPL-MCNC: 0.8 MG/DL — SIGNIFICANT CHANGE UP (ref 0.7–1.5)
DIFF PNL FLD: NEGATIVE — SIGNIFICANT CHANGE UP
EOSINOPHIL # BLD AUTO: 0.09 K/UL — SIGNIFICANT CHANGE UP (ref 0–0.7)
EOSINOPHIL NFR BLD AUTO: 1.2 % — SIGNIFICANT CHANGE UP (ref 0–8)
ERYTHROCYTE [SEDIMENTATION RATE] IN BLOOD: 72 MM/HR — HIGH (ref 0–10)
GLUCOSE SERPL-MCNC: 111 MG/DL — HIGH (ref 70–99)
GLUCOSE UR QL: NEGATIVE — SIGNIFICANT CHANGE UP
HCT VFR BLD CALC: 42.9 % — SIGNIFICANT CHANGE UP (ref 42–52)
HGB BLD-MCNC: 14.5 G/DL — SIGNIFICANT CHANGE UP (ref 14–18)
IMM GRANULOCYTES NFR BLD AUTO: 0.4 % — HIGH (ref 0.1–0.3)
KETONES UR-MCNC: NEGATIVE — SIGNIFICANT CHANGE UP
LEUKOCYTE ESTERASE UR-ACNC: NEGATIVE — SIGNIFICANT CHANGE UP
LYMPHOCYTES # BLD AUTO: 1.97 K/UL — SIGNIFICANT CHANGE UP (ref 1.2–3.4)
LYMPHOCYTES # BLD AUTO: 26.1 % — SIGNIFICANT CHANGE UP (ref 20.5–51.1)
MAGNESIUM SERPL-MCNC: 1.9 MG/DL — SIGNIFICANT CHANGE UP (ref 1.8–2.4)
MCHC RBC-ENTMCNC: 31.8 PG — HIGH (ref 27–31)
MCHC RBC-ENTMCNC: 33.8 G/DL — SIGNIFICANT CHANGE UP (ref 32–37)
MCV RBC AUTO: 94.1 FL — HIGH (ref 80–94)
MONOCYTES # BLD AUTO: 0.77 K/UL — HIGH (ref 0.1–0.6)
MONOCYTES NFR BLD AUTO: 10.2 % — HIGH (ref 1.7–9.3)
NEUTROPHILS # BLD AUTO: 4.66 K/UL — SIGNIFICANT CHANGE UP (ref 1.4–6.5)
NEUTROPHILS NFR BLD AUTO: 61.7 % — SIGNIFICANT CHANGE UP (ref 42.2–75.2)
NITRITE UR-MCNC: NEGATIVE — SIGNIFICANT CHANGE UP
NRBC # BLD: 0 /100 WBCS — SIGNIFICANT CHANGE UP (ref 0–0)
PH UR: 6.5 — SIGNIFICANT CHANGE UP (ref 5–8)
PLATELET # BLD AUTO: 332 K/UL — SIGNIFICANT CHANGE UP (ref 130–400)
POTASSIUM SERPL-MCNC: 4.2 MMOL/L — SIGNIFICANT CHANGE UP (ref 3.5–5)
POTASSIUM SERPL-SCNC: 4.2 MMOL/L — SIGNIFICANT CHANGE UP (ref 3.5–5)
PROT SERPL-MCNC: 7.9 G/DL — SIGNIFICANT CHANGE UP (ref 6–8)
PROT UR-MCNC: SIGNIFICANT CHANGE UP
RBC # BLD: 4.56 M/UL — LOW (ref 4.7–6.1)
RBC # FLD: 12.5 % — SIGNIFICANT CHANGE UP (ref 11.5–14.5)
SODIUM SERPL-SCNC: 138 MMOL/L — SIGNIFICANT CHANGE UP (ref 135–146)
SP GR SPEC: 1.01 — SIGNIFICANT CHANGE UP (ref 1.01–1.02)
UROBILINOGEN FLD QL: SIGNIFICANT CHANGE UP
WBC # BLD: 7.55 K/UL — SIGNIFICANT CHANGE UP (ref 4.8–10.8)
WBC # FLD AUTO: 7.55 K/UL — SIGNIFICANT CHANGE UP (ref 4.8–10.8)

## 2020-08-12 PROCEDURE — 73130 X-RAY EXAM OF HAND: CPT | Mod: 26,LT,RT

## 2020-08-12 PROCEDURE — 99223 1ST HOSP IP/OBS HIGH 75: CPT | Mod: 25

## 2020-08-12 PROCEDURE — 99285 EMERGENCY DEPT VISIT HI MDM: CPT

## 2020-08-12 PROCEDURE — 99408 AUDIT/DAST 15-30 MIN: CPT

## 2020-08-12 PROCEDURE — 73562 X-RAY EXAM OF KNEE 3: CPT | Mod: 26,LT,RT

## 2020-08-12 PROCEDURE — 73080 X-RAY EXAM OF ELBOW: CPT | Mod: 26,LT,RT

## 2020-08-12 RX ORDER — ACETAMINOPHEN 500 MG
975 TABLET ORAL ONCE
Refills: 0 | Status: COMPLETED | OUTPATIENT
Start: 2020-08-12 | End: 2020-08-12

## 2020-08-12 RX ORDER — ENOXAPARIN SODIUM 100 MG/ML
40 INJECTION SUBCUTANEOUS DAILY
Refills: 0 | Status: DISCONTINUED | OUTPATIENT
Start: 2020-08-12 | End: 2020-08-14

## 2020-08-12 RX ORDER — SODIUM CHLORIDE 9 MG/ML
1000 INJECTION INTRAMUSCULAR; INTRAVENOUS; SUBCUTANEOUS ONCE
Refills: 0 | Status: COMPLETED | OUTPATIENT
Start: 2020-08-12 | End: 2020-08-12

## 2020-08-12 RX ORDER — CHLORHEXIDINE GLUCONATE 213 G/1000ML
1 SOLUTION TOPICAL
Refills: 0 | Status: DISCONTINUED | OUTPATIENT
Start: 2020-08-12 | End: 2020-08-14

## 2020-08-12 RX ORDER — ACETAMINOPHEN 500 MG
650 TABLET ORAL EVERY 6 HOURS
Refills: 0 | Status: DISCONTINUED | OUTPATIENT
Start: 2020-08-12 | End: 2020-08-14

## 2020-08-12 RX ADMIN — SODIUM CHLORIDE 2000 MILLILITER(S): 9 INJECTION INTRAMUSCULAR; INTRAVENOUS; SUBCUTANEOUS at 11:56

## 2020-08-12 RX ADMIN — Medication 125 MILLIGRAM(S): at 12:32

## 2020-08-12 RX ADMIN — Medication 975 MILLIGRAM(S): at 12:32

## 2020-08-12 NOTE — ED PROVIDER NOTE - ATTENDING CONTRIBUTION TO CARE
33 yo m with no established pmh, presents with worsening joint pain and swelling x 4-5 days.  pt says his knees and elbows have been 33 yo m with no established pmh, presents with worsening joint pain and swelling x 4-5 days.  pt says his knees and elbows have been more swollen and painful and increased in stiffness.   pt says he fell 2 days ago because his knees felt so stiff.  denies any significant trauma.  no loc. no neck pain, cp, abd pain.  has had chronic issues and have seen 2 different rheumatologist and currently in the middle of rheum w/u with Dr. Duarte.  pt denies fever, chills, n/v/d, recent travel.  exam: nad, ncat, perrl, eomi, mmm, rrr, ctab, abd soft, nt,nd aox3, b/l elbows and knees with mild swelling, no erythema, able to have passive ROM imp: pt with UE and LE joint swelling, difficulty moving or walking, will admit for further rheum eval/rehab eval

## 2020-08-12 NOTE — H&P ADULT - ASSESSMENT
#Polyarthralgia  - throbbing pain in bilateral knees, elbows and ankle/feet with stiffness and decreased range of motion  - no muscle ache/fever/constitutional symptoms  - outpatient workup: elevated CRP 0.89, MEENA negative, Rheum factor negative, Lyme negative, Treponema negative, Smooth muscle antibody negative, Double stranded DNA negative, HIV and Hep C RNA negative, Quantiferon negative  - etiology unclear however possible spondyloarthropathy however patient does not have prominent axial symptoms  - send off HLAb27 screen  - s/p one dose of 125 solumedrol in ED -  which has led to patient feeling better  - continue with tylenol PRN and transition to prednisone  - patient has in the past had lip swelling with NSAID thus avoid for now  - rheum eval (Dr. Duarte)  - had fall onto his right hand with swelling of finger - xray to rule out fracture

## 2020-08-12 NOTE — H&P ADULT - NSICDXFAMILYHX_GEN_ALL_CORE_FT
FAMILY HISTORY:  Family history unknown, patient reports no known history of major family disease. Especially no joint disease/auto-immune disease.

## 2020-08-12 NOTE — ED ADULT NURSE NOTE - OBJECTIVE STATEMENT
Pt states 4 days ago started to have stiffness of his joints, fell 2 days ago. Right hand middle finger is swollen - swelling radiating to top of hand. Pt reports being unable to touch his face or walk properly. Pt says he stopped drinking 3 weeks ago pt states he used to drink a little every day. Similar situation happened to pt and he was told he has gout.

## 2020-08-12 NOTE — H&P ADULT - NSHPLABSRESULTS_GEN_ALL_CORE
14.5   7.55  )-----------( 332      ( 12 Aug 2020 12:45 )             42.9           138  |  100  |  8<L>  ----------------------------<  111<H>  4.2   |  24  |  0.8    Ca    9.7      12 Aug 2020 12:45  Mg     1.9         TPro  7.9  /  Alb  4.1  /  TBili  0.3  /  DBili  x   /  AST  16  /  ALT  7   /  AlkPhos  78                Urinalysis Basic - ( 12 Aug 2020 13:42 )    Color: Light Yellow / Appearance: Clear / S.013 / pH: x  Gluc: x / Ketone: Negative  / Bili: Negative / Urobili: <2 mg/dL   Blood: x / Protein: Trace / Nitrite: Negative   Leuk Esterase: Negative / RBC: x / WBC x   Sq Epi: x / Non Sq Epi: x / Bacteria: x            Lactate Trend            CAPILLARY BLOOD GLUCOSE    < from: Xray Elbow AP + Lateral + Oblique, Bilat (20 @ 13:38) >    INTERPRETATION:  CLINICAL HISTORY:  Edema and pain    TECHNIQUE:  AP, oblique and lateral views of both elbows were performed.    COMPARISON:  None.    FINDINGS:  There is no evidence of fracture or dislocation. The anterior fat pad is unremarkable.  The posterior fat pad is not visualized.  There is no soft tissue swelling or radiopaque foreign body. The visualized joint spaces, alignment and bony mineralization are within normal limits.    IMPRESSION:    1.  THERE IS NO EVIDENCE OF FRACTURE OR DISLOCATION.    < end of copied text >    < from: Xray Knee 3 Views, Bilateral (20 @ 13:37) >    PROCEDURE DATE:  2020            INTERPRETATION:  CLINICAL HISTORY:  Edema and pain    TECHNIQUE:  AP and lateral views of both knees were obtained.    COMPARISON/CORRELATION:  2019    FINDINGS:  There is no evidence of fracture or dislocation. There is no soft tissue swelling or radiopaque foreign body.    The joint spaces are well maintained.    There is no joint effusion.    Bony mineralization is within normal limits.    IMPRESSION:    There is no fracture or dislocation.    < end of copied text >

## 2020-08-12 NOTE — ED ADULT NURSE NOTE - INTERVENTIONS DEFINITIONS
Stretcher in lowest position, wheels locked, appropriate side rails in place/Monitor gait and stability/Reinforce activity limits and safety measures with patient and family/[place] to call system/Instruct patient to call for assistance/Physically safe environment: no spills, clutter or unnecessary equipment/Provide visual cue, wrist band, yellow gown, etc./Monitor for mental status changes and reorient to person, place, and time

## 2020-08-12 NOTE — H&P ADULT - HISTORY OF PRESENT ILLNESS
35 yo male with no known pmh presents c/o bilateral knee and elbow pain for 4 days. pt states to have experienced this several times over the past year and recently followed with rheumatologist Dr. Duarte in February and being worked up for diagnosis. pt denies any traumas to area and states to have fell 2 days prior due to stiffness in his knee. denies any head trauma/LOC/headache. denies any other symptoms including fevers, chill, headache, recent illness/travel, cough, urinary/bowel changes, penile discharge, abdominal pain, chest pain, or SOB. 33 yo male w/ PMHx of persistent joint pain presents c/o bilateral knee, ankle and elbow pain. Patient has been having off and on joint pain for just over one year. Pain has mostly been in both knees and both ankles. He would have morning stiffness that lasted one to one and half hours and then would get better after being active. Throughout the day the stiffness would be better then in the evening he would have throbbing pain in both knees and ankles. He was given a course of steroids earlier this year in February/March which gave him some relief however he has been off steroids since end of March. Dose was 20mg. He was referred to rheumatology (Dr. Duarte) where extensive auto-immune workup was done. Ultimately so far the workup has been consistent with a peripheral spondyloarthropathy. Consideration was given to starting methotrexate however given that patient was a heavier drinker of alcohol he was not a candidate for MTX. MRI of foot/ankle was ordered, patient was to lower prednisone dose and to follow up in 2 months. However patient was lost to follow up. Over time his symptoms were stable however since one week ago he has had significant worsening in his symptoms. He has had throbbing pain in his knees and ankles/feet. Pain has been constant throughout the day with no relief. He has mostly been staying on the couch and has been reliant on his significant other for assistance with daily activities. Additionally five days ago he started to have throbbing pain and stiffness in both elbows - something that has not happened before. He has also had falls secondary to his limited mobility causing him to stub his right third finger. Denies any head trauma/LOC/headache, fevers, chill, headache, recent illness/travel, cough, urinary/bowel symptoms, abdominal pain, chest pain, or SOB.

## 2020-08-12 NOTE — ED ADULT TRIAGE NOTE - CHIEF COMPLAINT QUOTE
pt c.o weakness and inability to walk. pt had fallen about 2 days ago. pt denies any LOC. pt also c.o right hand pain. middle finger appears swollen

## 2020-08-12 NOTE — H&P ADULT - NSHPSOCIALHISTORY_GEN_ALL_CORE
Prior drinking of alcohol however reports no alcohol for 3 weeks. Prior drinking of alcohol however reports no alcohol for 3 weeks. smokes marijuana blunts, denies illicit drug abuse or tobacco abuse

## 2020-08-12 NOTE — ED PROVIDER NOTE - NS ED ROS FT
Constitutional: (-) fever  Eyes/ENT: (-) visual changes   Cardiovascular: (-) chest pain, (-) syncope  Respiratory: (-) cough, (-) shortness of breath  Gastrointestinal: (-) vomiting, (-) diarrhea  Genitourinary: (-) dysuria, (-) hesitancy, (-) frequency   Musculoskeletal: (-) neck pain, (-) back pain, (+) joint pain  Integumentary: (-) rash, (+) edema  Neurological: (-) headache, (-) altered mental status  Allergic/Immunologic: (-) pruritus

## 2020-08-12 NOTE — ED PROVIDER NOTE - CLINICAL SUMMARY MEDICAL DECISION MAKING FREE TEXT BOX
pt with UE and LE joint swelling, difficulty moving or walking, will admit for further rheum eval/rehab eval

## 2020-08-12 NOTE — H&P ADULT - ATTENDING COMMENTS
PHYSICAL EXAM:    CONSTITUTIONAL: NAD  ENMT: EOMI, PERRLA, No tonsillar erythema, exudates, or enlargement, neck supple, No JVD  PSYCH: Alert & Oriented X3  RESPIRATORY: Clear to percussion bilaterally; No rales, rhonchi, wheezing, or rubs  CARDIOVASCULAR: Regular rate and rhythm; No murmurs, rubs, or gallops, negative edema  GASTROINTESTINAL: Soft, Nontender, Nondistended; Bowel sounds present  EXTREMITIES:  2+ Peripheral Pulses, No clubbing, cyanosis, limited ROM of motion of bilateral upper and lower extremities swelling of right  digit  SKIN: No rashes or lesions    33 yo M with PMHx of suspected Peripheral Spondyloarthropathy, history of ETOH dependence (drinks 1-2 shots of [place] and "couple of beers" every other day, last drink of Bacardi 3 weeks ago) presented with complaint of worsening bilateral knee pain for over one year duration with resultant difficulty ambulating, states he "had to come in a wheelchair", as well as new onset bilateral elbow pain for one week's duration. Patient also sustained a mechanical fall a few days prior secondary to sensation of his knees "giving out", denies antecedent symptoms, head trauma, loss of consciousness. Patient also describes recurrent episodes of facial swelling, pruritis, and swelling of his fingers from time to time, last episode occurred 3 weeks ago which he attributed to drinking bacardi which he never had, patient previously attributed symptoms to taking NSAIDs, however states he has been taking them recently with no reoccurrence of swelling, remaining ROS unrevealing.     #Polyarthralgia secondary to Peripheral Spondyloarthropathy?, Difficulty Ambulating (though he states since he has been in the ED he has been able to walk from his room in ED3 bed 14 to nurses station as well as to the bathroom and back without difficulty), history of intermittent Dactylitis, Facial Angioedema (patient has pictures on his cellphone which I saw) vs. Allergic Reaction?: Awaiting Rheumatology evaluation, ESR elevated, s/p 125mg of Solumedrol in ED which patient states he has felt better since, continue prednisone for now, fall precautions, patient agreeable to OT/PT, consider allergist referral upon discharge     #s/p recent mechanical fall sustaining "stubbed" right hand third digit: no fractures noted on xray, orthopedic evaluation if no improvement in swelling, noted diffuse swelling of tear around PIP on xray, though not specified which digit    #history of ETOH dependence: CIWA score zero, continue monitoring, multivitamin     Disposition: Home when medically stable PHYSICAL EXAM:    CONSTITUTIONAL: NAD  ENMT: EOMI, PERRLA, No tonsillar erythema, exudates, or enlargement, neck supple, No JVD  PSYCH: Alert & Oriented X3  RESPIRATORY: Clear to percussion bilaterally; No rales, rhonchi, wheezing, or rubs  CARDIOVASCULAR: Regular rate and rhythm; No murmurs, rubs, or gallops, negative edema  GASTROINTESTINAL: Soft, Nontender, Nondistended; Bowel sounds present  EXTREMITIES:  2+ Peripheral Pulses, No clubbing, cyanosis, limited ROM of motion of bilateral upper and lower extremities swelling of right  digit  SKIN: No rashes or lesions    33 yo M with PMHx of suspected Peripheral Spondyloarthropathy, history of ETOH dependence (drinks 1-2 shots of [place] and "couple of beers" every other day, last drink of Bacardi 3 weeks ago) presented with complaint of worsening bilateral knee pain for over one year duration with resultant difficulty ambulating, states he "had to come in a wheelchair", as well as new onset bilateral elbow pain for one week's duration. Patient also sustained a mechanical fall a few days prior secondary to sensation of his knees "giving out", denies antecedent symptoms, head trauma, loss of consciousness. Patient also describes recurrent episodes of facial swelling, pruritis, and swelling of his fingers from time to time, last episode occurred 3 weeks ago which he attributed to drinking bacardi which he never had, patient previously attributed symptoms to taking NSAIDs, however states he has been taking them recently with no reoccurrence of swelling, remaining ROS unrevealing.     #Polyarthralgia secondary to Peripheral Spondyloarthropathy?, Difficulty Ambulating (though he states he has been able to walk from his room in ED3 bed 14 to nurses station as well as to the bathroom and back without difficulty), history of intermittent Dactylitis, Facial Angioedema (patient has pictures on his cellphone which I saw) vs. Allergic Reaction?: Awaiting Rheumatology evaluation, ESR elevated, s/p 125mg of Solumedrol in ED which patient states he has felt better since, continue prednisone for now, fall precautions, patient agreeable to OT/PT, consider allergist referral upon discharge     #s/p recent mechanical fall sustaining "stubbed" right hand third digit: no fractures noted on xray, orthopedic evaluation if no improvement in swelling, noted diffuse swelling of tear around PIP on xray, though not specified which digit    #history of ETOH dependence: CIWA score zero, continue monitoring, multivitamin     Disposition: Home when medically stable

## 2020-08-12 NOTE — ED PROVIDER NOTE - OBJECTIVE STATEMENT
35 yo male with no known pmh presents c/o bilateral knee and elbow pain for 4 days. pt states to have experienced this several times over the past year and recently followed with rheumatologist Dr. Duarte in February and being worked up for diagnosis. pt denies any traumas to area and states to have fell 2 days prior due to stiffness in his knee. denies any head trauma/LOC/headache. denies any other symptoms including fevers, chill, headache, recent illness/travel, cough, urinary/bowel changes, penile discharge, abdominal pain, chest pain, or SOB.

## 2020-08-12 NOTE — ED PROVIDER NOTE - PHYSICAL EXAMINATION
Gen: NAD, AOx3  Head: NCAT  HEENT: PERRL, oral mucosa moist, normal conjunctiva, oropharynx clear without exudate or erythema  Lung: CTAB, no respiratory distress, no wheezing, rales, rhonchi  CV: normal s1/s2, rrr, Normal perfusion, pulses 2+ throughout  Abd: soft, NTND, no CVA tenderness  Genitourinary: no pelvic tenderness  MSK: no visible deformities, edema/tenderness w/ ROM of bilateral elbows and knees.   Neuro: No focal neurologic deficits  Skin: No rash   Psych: normal affect

## 2020-08-13 ENCOUNTER — TRANSCRIPTION ENCOUNTER (OUTPATIENT)
Age: 35
End: 2020-08-13

## 2020-08-13 LAB
CRP SERPL-MCNC: 2.37 MG/DL — HIGH (ref 0–0.4)
CULTURE RESULTS: SIGNIFICANT CHANGE UP
SARS-COV-2 RNA SPEC QL NAA+PROBE: SIGNIFICANT CHANGE UP
SPECIMEN SOURCE: SIGNIFICANT CHANGE UP
URATE SERPL-MCNC: 5.1 MG/DL — SIGNIFICANT CHANGE UP (ref 3.4–8.8)

## 2020-08-13 PROCEDURE — 71046 X-RAY EXAM CHEST 2 VIEWS: CPT | Mod: 26

## 2020-08-13 PROCEDURE — 99233 SBSQ HOSP IP/OBS HIGH 50: CPT

## 2020-08-13 RX ORDER — THIAMINE MONONITRATE (VIT B1) 100 MG
100 TABLET ORAL DAILY
Refills: 0 | Status: DISCONTINUED | OUTPATIENT
Start: 2020-08-13 | End: 2020-08-14

## 2020-08-13 RX ORDER — FOLIC ACID 0.8 MG
1 TABLET ORAL DAILY
Refills: 0 | Status: DISCONTINUED | OUTPATIENT
Start: 2020-08-13 | End: 2020-08-14

## 2020-08-13 RX ADMIN — Medication 1 MILLIGRAM(S): at 14:37

## 2020-08-13 RX ADMIN — Medication 1 TABLET(S): at 12:36

## 2020-08-13 RX ADMIN — Medication 20 MILLIGRAM(S): at 05:53

## 2020-08-13 RX ADMIN — Medication 100 MILLIGRAM(S): at 18:20

## 2020-08-13 NOTE — DISCHARGE NOTE PROVIDER - CARE PROVIDER_API CALL
Jace Kaye (DO)  Internal Medicine  [address] [place] [address], [address]  [place] [address]  Phone: [phone]  Fax: [phone]  Follow Up Time: 2 weeks Jace Kaye (DO)  Internal Medicine  [address] [place] [address], [address]  [place] [address]  Phone: [phone]  Fax: [phone]  Follow Up Time: 2 weeks

## 2020-08-13 NOTE — CONSULT NOTE ADULT - ASSESSMENT
34 year old healthy male presented for symptoms of joint pain. Rheumatology has been consulted for joint pains.      1. Multiple joint pain    -Elbows, knees, ankles and right hand associated with stiffness lasting all day, better with prednisone, and worse when taken off prednisone. Symptoms are suspicious for inflammatory arthriits  -In the past RF, CCP and MEENA negative  -Differential diagnosis includes seronegative RA, peripheral SpA (? dactylitis related to trauma), sarcoidosis (young AA male)    Recommendations:    -Continue prednisone 20 mg daily until he sees me as outpatient in 1-2 weeks  -Check CXR, ACE level, lysozyme for evaluation of sarcoidosis  -Discussed at length treatment options including DMARD therapy MTX, Arava, SSZ. Would rather avoid MTX due to  history of consistent/regular ETOH use prior to 3 weeks ago when he quit. Arava and SSZ are both reasonable options and discussed risk and benefits. We can add this medication at follow up when he sees me.       Recommendations discussed with primary team. Thank you for consulting Rheumatology. We will follow closely with you. Feel free to call [phone] with any questions or concerns

## 2020-08-13 NOTE — DISCHARGE NOTE PROVIDER - NSDCMRMEDTOKEN_GEN_ALL_CORE_FT
Aleve 220 mg oral tablet: Aleve 220 mg oral tablet:   folic acid 1 mg oral tablet: 1 tab(s) orally once a day  Multiple Vitamins oral tablet: 1 tab(s) orally once a day  predniSONE 20 mg oral tablet: 1 tab(s) orally once a day  thiamine 100 mg oral tablet: 1 tab(s) orally once a day

## 2020-08-13 NOTE — DISCHARGE NOTE PROVIDER - HOSPITAL COURSE
35 yo male w/ PMHx of persistent joint pain presents c/o bilateral knee, ankle and elbow pain. Patient has been having off and on joint pain for just over one year. Pain has mostly been in both knees and both ankles. Since the last one week however since one week ago he has had significant worsening in his symptoms. He has had throbbing pain in his knees and ankles/feet. Pain has been constant throughout the day with no relief. He has mostly been staying on the couch and has been reliant on his significant other for assistance with daily activities. Presented to the ED and was given IV solumedrol which made him feel better. Xrays were done and showed no acute abnormalities. Lab work only showed elevated ESR and CRP. Transitioned to PO prednisone 20mg which he will be discharged on. Rheum saw patient with leading diagnosis of some form of inflammatory arthiritis. 33 yo male w/ PMHx of persistent joint pain presents c/o bilateral knee, ankle and elbow pain. Patient has been having off and on joint pain for just over one year. Pain has mostly been in both knees and both ankles. Since the last one week however since one week ago he has had significant worsening in his symptoms. He has had throbbing pain in his knees and ankles/feet. Pain has been constant throughout the day with no relief. He has mostly been staying on the couch and has been reliant on his significant other for assistance with daily activities. Presented to the ED and was given IV solumedrol which made him feel better. Xrays were done and showed no acute abnormalities. Lab work only showed elevated ESR and CRP. Transitioned to PO prednisone 20mg which he will be discharged on. Rheum saw patient and differential diagnosis includes rheumatoid arthiritis vs. peripheral spondyloarthropathy vs. seronegative RA. Chest Xray, ACE level and lysozyme levels were obtained to work up for sarcoidosis. Patient will follow up results outpatient with rheumatology and be discharged on oral course of prednisone for at least 2 weeks however may be extended to 1-2 months on follow up with rheumatology.

## 2020-08-13 NOTE — MEDICAL STUDENT PROGRESS NOTE(EDUCATION) - NS MD HP STUD ASPLAN PLAN FT
#Polyarthropathy  - outpatient workup: elevated CRP 0.89, MEENA neg, Rheum factor neg, Lyme neg, Treponema neg, Smooth muscle Ab neg, dsDNA neg, HIV and Hep C RNA neg, Quantiferon neg  - s/p one dose of 125mg solumedrol in ED  - Tylenol PRN, prednisone 20mg   - Afebrile today, WBC wnl, UA neg   - elevated ESR 72, CRP 2.37  - Uric acid wnl   - Xray of knee, elbow and hand wnl  - f/u HLA-B27, UCx   - Rheum consult   - PT consult     #Miscellaneous  - DVT ppx: lovenox   - Diet: regular

## 2020-08-13 NOTE — MEDICAL STUDENT PROGRESS NOTE(EDUCATION) - SUBJECTIVE AND OBJECTIVE BOX
LIZETT ROGER 34y Male    Patient is a 34y old Male who presents with a chief complaint of bilateral knee and elbow pain. Patient has been having intermittent pain in both knees and ankles for over one year. Morning stiffness last for 1-1.5 hours, alleviated with activity and worsen at night. Saw his PCP and given 20mg steroids. He saw rheumatology (Dr. Duarte) where workup was consistent with peripheral spondyloarthropathy. Due to patient being a heavy drinker, methotrexate was not given. MRI of foot/ankle was ordered but patient did not follow up. Since last week, patient has been having throbbing pain in his knees and ankles, pain 10/10. He would be bedridden and relies on his girlfriend for daily activities. He also fell due to his limited mobility which caused him to jab his third right finger. Denies any head trauma, fevers, chill, recent illness/travel, urinary symptoms, abdominal pain, chest pain or shortness of breath.     INTERVAL HPI/OVERNIGHT EVENTS:   No acute events overnight. Patient was seen and evaluated at the bedside. This AM, patient reports pain on the right knee and resolved with prednisone. He is sleeping, eating, eliminating and ambulating. The patient rates pain 4/10. Vitals stable.     REVIEW OF SYSTEMS:  CONSTITUTIONAL: No fever, weight loss, or fatigue  EYES: No eye pain, visual disturbances, or discharge  ENMT:  No difficulty hearing, tinnitus, vertigo; No sinus or throat pain  RESPIRATORY: No cough, wheezing, chills or hemoptysis; No shortness of breath  CARDIOVASCULAR: No chest pain, palpitations, dizziness, or leg swelling  GASTROINTESTINAL: No abdominal pain. No diarrhea or constipation. No nausea, vomiting, or hematemesis. No melena or hematochezia.  NEUROLOGICAL: No headaches, memory loss, loss of strength, numbness, or tremors  MUSCULOSKELETAL: + joint pain and swelling on fingers and knee; No muscle, back, or extremity pain  SKIN: No itching, burning, rashes, or lesions   PSYCHIATRIC: No depression, no anxiety    PHYSICAL EXAM:  GENERAL: NAD, well-groomed, well-developed  HEAD:  Atraumatic, Normocephalic  EYES: EOMI, PERRLA, conjunctiva and sclera clear  ENMT: Moist mucous membranes, No lesions  NERVOUS SYSTEM:  Alert & Oriented X3, CN I-XII intact bilaterally, limited range of motion of fingers, elbows, knee  CHEST/LUNG: Clear to auscultation bilaterally; No rales, rhonchi, wheezing, or rubs  HEART: Regular rate and rhythm; No murmurs, rubs, or gallops  ABDOMEN: Soft, Nontender, Nondistended; Bowel sounds present  EXTREMITIES:  2+ Peripheral Pulses, No clubbing, cyanosis, or edema, + swelling of digits bilaterally, most prominent on right middle finger   LYMPH: No lymphadenopathy noted  SKIN: No rashes or lesions    Vital Signs Last 24 Hrs  T(C): 35.8 (13 Aug 2020 07:30), Max: 37.1 (12 Aug 2020 23:10)  T(F): 96.4 (13 Aug 2020 07:30), Max: 98.8 (12 Aug 2020 23:10)  HR: 56 (13 Aug 2020 07:30) (51 - 71)  BP: 128/70 (13 Aug 2020 07:30) (112/73 - 134/94)  BP(mean): --  RR: 18 (13 Aug 2020 07:30) (18 - 18)  SpO2: 98% (12 Aug 2020 23:10) (98% - 98%)      Consultant(s) Notes Reviewed:  [X] YES  [ ] NO  Care Discussed with Consultants/Other Providers [X] YES  [ ] NO  Imaging Personally Reviewed:  [X] YES  [ ] NO      LABS:                        14.5   7.55  )-----------( 332      ( 12 Aug 2020 12:45 )             42.9     08-12    138  |  100  |  8<L>  ----------------------------<  111<H>  4.2   |  24  |  0.8    Ca    9.7      12 Aug 2020 12:45  Mg     1.9     08-12    TPro  7.9  /  Alb  4.1  /  TBili  0.3  /  DBili  x   /  AST  16  /  ALT  7   /  AlkPhos  78  08-12      Urinalysis Basic - ( 12 Aug 2020 13:42 )    Color: Light Yellow / Appearance: Clear / S.013 / pH: x  Gluc: x / Ketone: Negative  / Bili: Negative / Urobili: <2 mg/dL   Blood: x / Protein: Trace / Nitrite: Negative   Leuk Esterase: Negative / RBC: x / WBC x   Sq Epi: x / Non Sq Epi: x / Bacteria: x    < from: Xray Knee 3 Views, Bilateral (20 @ 13:37) >  EXAM:  XR KNEE AP LAT OBL 3 VIEWS BI          PROCEDURE DATE:  2020      INTERPRETATION:  CLINICAL HISTORY:  Edema and pain    TECHNIQUE:  AP and lateral views of both knees were obtained.    COMPARISON/CORRELATION:  2019    FINDINGS:  There is no evidence of fracture or dislocation. There is no soft tissue swelling or radiopaque foreign body.    The joint spaces are well maintained.    There is no joint effusion.    Bony mineralization is within normal limits.    IMPRESSION:    There is no fracture or dislocation.    JOSSELINE PERALTA M.D., ATTENDING RADIOLOGIST  This document has been electronically signed. Aug 12 2020  3:34PM      < from: Xray Elbow AP + Lateral + Oblique, Bilat (20 @ 13:38) >  EXAM:  XR ELBOW COMP MIN 3V BI          PROCEDURE DATE:  2020      INTERPRETATION:  CLINICAL HISTORY:  Edema and pain    TECHNIQUE:  AP, oblique and lateral views of both elbows were performed.    COMPARISON:  None.    FINDINGS:  There is no evidence of fracture or dislocation. The anterior fat pad is unremarkable.  The posterior fat pad is not visualized.  There is no soft tissue swelling or radiopaque foreign body. The visualized joint spaces, alignment and bony mineralization are within normal limits.    IMPRESSION:    1.  THERE IS NO EVIDENCE OF FRACTURE OR DISLOCATION.    JOSSELINE PERALTA M.D., ATTENDING RADIOLOGIST  This document has been electronically signed. Aug 12 2020  3:24PM          < from: Xray Hand 3 Views, Bilateral (20 @ 15:04) >    EXAM:  XR HAND MIN 3 VIEWS BI          PROCEDURE DATE:  2020      INTERPRETATION:  CLINICAL HISTORY:  Pain and edema    TECHNIQUE: Frontal, lateral and oblique views of both hands    COMPARISON:  None.    FINDINGS:  There is no evidence of fracture or dislocation.  There is diffuse swelling of the third middle finger, without any adjacent bony abnormalities..    There is no radiopaque foreign body.    The joint spaces are normal.  The bony mineralization is within normal limits.    IMPRESSION:    There is no evidence of fracture or dislocation.    Diffuse swelling of the tear] around the PIP, with no adjacent bony changes.      JOSSELINE PERALTA M.D., ATTENDING RADIOLOGIST  This document has been electronically signed. Aug 12 2020  4:06PM

## 2020-08-13 NOTE — MEDICAL STUDENT PROGRESS NOTE(EDUCATION) - NS MD HP STUD ASPLAN ASSES FT
33 y/o M with persistent joint pain presents with 1 week worsening pain of bilateral knee, ankle and elbow. Admitted for primary diagnosis of polyarthropathy.

## 2020-08-13 NOTE — PHYSICAL THERAPY INITIAL EVALUATION ADULT - ACTIVE RANGE OF MOTION EXAMINATION, REHAB EVAL
no Active ROM deficits were identified/AROM grossly WFL t/o except b/l elbow flexion ~3/4 range due to pain, R knee ext impaired in isolated MMT.

## 2020-08-13 NOTE — PHYSICAL THERAPY INITIAL EVALUATION ADULT - GAIT DEVIATIONS NOTED, PT EVAL
No unsteadiness or knee buckling observed during amb and transfers, however pt unable to complete short arc quad exercise when supine in bed with PT providing support to bolster R knee into flexion. Pt observed with R knee extensor weakness. Pt also unable to perform SLR against gravity with RLE; appears WFL t/o LLE.

## 2020-08-13 NOTE — PHYSICAL THERAPY INITIAL EVALUATION ADULT - PERTINENT HX OF CURRENT PROBLEM, REHAB EVAL
Patient is a 34y old Male who presents with a chief complaint of bilateral knee and elbow pain. Patient has been having intermittent pain in both knees and ankles for over one year. Morning stiffness last for 1-1.5 hours, alleviated with activity and worsen at night. Saw his PCP and given 20mg steroids. He saw rheumatology (Dr. Duarte) where workup was consistent with peripheral spondyloarthropathy.

## 2020-08-13 NOTE — PHYSICAL THERAPY INITIAL EVALUATION ADULT - GENERAL OBSERVATIONS, REHAB EVAL
Pt rec supine in bed with in NAD. Pt agreeable to b/s PT. Pt presents independent with functional mobility however does present with some LE muscle weakness during MMT. Pt c/o 4/10 R knee pain, and 2/10 R ankle pain during weight bearing, no pain at rest.

## 2020-08-13 NOTE — CONSULT NOTE ADULT - SUBJECTIVE AND OBJECTIVE BOX
HEMATOLOGY ONCOLOGY CONSULT     Patient is a 34y old  Male who presents with a chief complaint of bilateral knee and elbow pain (13 Aug 2020 15:35)      HPI:  35 yo male no significant past medical history presented to the ED with symptoms of joint pain that started 1-1.5 weeks ago. Rheumatology is asked to see patient for joint pains.     Patient reports he initially started feeling joint pains about 2 years ago. He used to be very active with mountain biking and home exercises like push ups, etc. He noted a difficult break up at the time, and increased ETOH use. He was started on prednisone by his PCP but ultimately was discontinued over long term fear of chronic use and risk of side effects. He was seen in Rheumatology clinic in February 2020 with impression of possible peripheral SpA and plans to start DMARD therapy and obtain MRI L ankle. He reported that two months ago he was symptom free, except for bilateral knee stiffness which he mentions has been bothering him all year. He denies recent infection. He reports symptoms of joint pain in bilateral elbows, knees, ankles, and right hand. He reported falling at home and jamming his right 3rd finger. It became swollen, which has improved. He reports bilateral knee stiffness lasting all day, hes unable to bend his knees, and stiffness/pain in his elbows. He reports being unable to flex his elbows to a certain point. His knee pain progressed to involve his ankles and has had a difficult time walking at home. He reports swelling in his right 3rd finger that involved his hand, but has since improved. He's tried Aleve, Motrin and Tylenol with partial relief. Aleve resulted in lip swelling (showed pictures from phone). Nothing makes his symptoms worse. Patient started on prednisone 20 mg and repots significant improvement in bending knees (was not able to do this prior to admision)    He denies uveitis, inflammatory bowel disease, psoriasis, recent infection, or low back stiffness. He was seeing dermatology for ? scalp folliculitis and was prescribed prescription shampoo. He quit ETOH 3 weeks ago.    He denies shortness of breath, cough or rash.    He denies fatigue, unintended weight loss, night sweats, dry eyes, dry mouth, oral ulcers, nasal ulcers, hair loss, sinus problems, nosebleeds, raynaud's, photosensitivity, chest pain, dysphagia, GERD, abdominal pain, nausea, vomiting, diarrhea, constipation, blood in stools, dysuria, hematuria, headaches, dizziness.       Labs show ESR 72, CRP 2.37. CBC, CMP, UA normal and Uric acid 5.1.    Imaging: x-ray of the hands bilaterally showed diffuse swelling of right 3rd PIP, x-ray of elbows and knees are normal.    ROS:  12 point reviewed and negative unless in HPI    PAST MEDICAL & SURGICAL HISTORY:  No pertinent past medical history  No significant past surgical history      SOCIAL HISTORY:    FAMILY HISTORY:  Family history unknown: patient reports no known history of major family disease. Especially no joint disease/auto-immune disease.      MEDICATIONS  (STANDING):  chlorhexidine 4% Liquid 1 Application(s) Topical <User Schedule>  enoxaparin Injectable 40 milliGRAM(s) SubCutaneous daily  folic acid 1 milliGRAM(s) Oral daily  multivitamin 1 Tablet(s) Oral daily  predniSONE   Tablet 20 milliGRAM(s) Oral daily  thiamine 100 milliGRAM(s) Oral daily    MEDICATIONS  (PRN):  acetaminophen   Tablet .. 650 milliGRAM(s) Oral every 6 hours PRN Mild Pain (1 - 3)      Allergies    Motrin (Unknown)  naproxen (Anaphylaxis)    Intolerances        Vital Signs Last 24 Hrs  T(C): 35.9 (13 Aug 2020 15:25), Max: 37.1 (12 Aug 2020 23:10)  T(F): 96.6 (13 Aug 2020 15:25), Max: 98.8 (12 Aug 2020 23:10)  HR: 57 (13 Aug 2020 15:25) (51 - 71)  BP: 145/89 (13 Aug 2020 15:25) (112/73 - 145/89)  BP(mean): --  RR: 18 (13 Aug 2020 15:25) (18 - 18)  SpO2: 98% (12 Aug 2020 23:10) (98% - 98%)    PHYSICAL EXAM  General: adult in NAD  HEENT: clear oropharynx, anicteric sclera, pink conjunctiva  Neck: supple  CV: normal S1/S2 with no murmur rubs or gallops  Lungs: positive air movement b/l ant lungs,clear to auscultation, no wheezes, no rales  Abdomen: soft non-tender non-distended, no hepatosplenomegaly  Ext: no clubbing cyanosis or edema  Skin: no rashes and no petechiae  Neuro: alert and oriented X 4, no focal deficits      LABS:                          14.5   7.55  )-----------( 332      ( 12 Aug 2020 12:45 )             42.9         Mean Cell Volume : 94.1 fL  Mean Cell Hemoglobin : 31.8 pg  Mean Cell Hemoglobin Concentration : 33.8 g/dL  Auto Neutrophil # : 4.66 K/uL  Auto Lymphocyte # : 1.97 K/uL  Auto Monocyte # : 0.77 K/uL  Auto Eosinophil # : 0.09 K/uL  Auto Basophil # : 0.03 K/uL  Auto Neutrophil % : 61.7 %  Auto Lymphocyte % : 26.1 %  Auto Monocyte % : 10.2 %  Auto Eosinophil % : 1.2 %  Auto Basophil % : 0.4 %      08-12    138  |  100  |  8<L>  ----------------------------<  111<H>  4.2   |  24  |  0.8    Ca    9.7      12 Aug 2020 12:45  Mg     1.9     08-12    TPro  7.9  /  Alb  4.1  /  TBili  0.3  /  DBili  x   /  AST  16  /  ALT  7   /  AlkPhos  78  08-12                      BLOOD SMEAR INTERPRETATION:       RADIOLOGY & ADDITIONAL STUDIES: HEMATOLOGY ONCOLOGY CONSULT     Patient is a 34y old  Male who presents with a chief complaint of bilateral knee and elbow pain (13 Aug 2020 15:35)      HPI:  33 yo male no significant past medical history presented to the ED with symptoms of joint pain that started 1-1.5 weeks ago. Rheumatology is asked to see patient for joint pains.     Patient reports he initially started feeling joint pains about 2 years ago. He used to be very active with mountain biking and home exercises like push ups, etc. He noted a difficult break up at the time, and increased ETOH use. He was started on prednisone by his PCP but ultimately was discontinued over long term fear of chronic use and risk of side effects. He was seen in Rheumatology clinic in February 2020 with impression of possible peripheral SpA and plans to start DMARD therapy and obtain MRI L ankle. He reported that two months ago he was symptom free, except for bilateral knee stiffness which he mentions has been bothering him all year. He denies recent infection. He reports symptoms of joint pain in bilateral elbows, knees, ankles, and right hand. He reported falling at home and jamming his right 3rd finger. It became swollen, which has improved. He reports bilateral knee stiffness lasting all day, hes unable to bend his knees, and stiffness/pain in his elbows. He reports being unable to flex his elbows to a certain point. His knee pain progressed to involve his ankles and has had a difficult time walking at home. He reports swelling in his right 3rd finger that involved his hand, but has since improved. He's tried Aleve, Motrin and Tylenol with partial relief. Aleve resulted in lip swelling (showed pictures from phone). Nothing makes his symptoms worse. Patient started on prednisone 20 mg and repots significant improvement in bending knees (was not able to do this prior to admision)    He denies uveitis, inflammatory bowel disease, psoriasis, recent infection, or low back stiffness. He was seeing dermatology for ? scalp folliculitis and was prescribed prescription shampoo. He quit ETOH 3 weeks ago.    He denies shortness of breath, cough or rash.    He denies fatigue, unintended weight loss, night sweats, dry eyes, dry mouth, oral ulcers, nasal ulcers, hair loss, sinus problems, nosebleeds, raynaud's, photosensitivity, chest pain, dysphagia, GERD, abdominal pain, nausea, vomiting, diarrhea, constipation, blood in stools, dysuria, hematuria, headaches, dizziness.       Labs show ESR 72, CRP 2.37. CBC, CMP, UA normal and Uric acid 5.1.    Imaging: x-ray of the hands bilaterally showed diffuse swelling of right 3rd PIP, x-ray of elbows and knees are normal.    ROS:  12 point reviewed and negative unless in HPI    PAST MEDICAL & SURGICAL HISTORY:  No pertinent past medical history  No significant past surgical history      SOCIAL HISTORY: denies ETOH or tobacco    FAMILY HISTORY:  Family history unknown: patient reports no known history of major family disease. Especially no joint disease/auto-immune disease.      MEDICATIONS  (STANDING):  chlorhexidine 4% Liquid 1 Application(s) Topical <User Schedule>  enoxaparin Injectable 40 milliGRAM(s) SubCutaneous daily  folic acid 1 milliGRAM(s) Oral daily  multivitamin 1 Tablet(s) Oral daily  predniSONE   Tablet 20 milliGRAM(s) Oral daily  thiamine 100 milliGRAM(s) Oral daily    MEDICATIONS  (PRN):  acetaminophen   Tablet .. 650 milliGRAM(s) Oral every 6 hours PRN Mild Pain (1 - 3)      Allergies    Motrin (Unknown)  naproxen (Anaphylaxis)    Intolerances        Vital Signs Last 24 Hrs  T(C): 35.9 (13 Aug 2020 15:25), Max: 37.1 (12 Aug 2020 23:10)  T(F): 96.6 (13 Aug 2020 15:25), Max: 98.8 (12 Aug 2020 23:10)  HR: 57 (13 Aug 2020 15:25) (51 - 71)  BP: 145/89 (13 Aug 2020 15:25) (112/73 - 145/89)  BP(mean): --  RR: 18 (13 Aug 2020 15:25) (18 - 18)  SpO2: 98% (12 Aug 2020 23:10) (98% - 98%)    PHYSICAL EXAM  General: adult in NAD  HEENT: clear oropharynx, anicteric sclera, pink conjunctiva  Neck: supple  CV: normal S1/S2 with no murmur rubs or gallops  Lungs: positive air movement b/l ant lungs,clear to auscultation, no wheezes, no rales  Abdomen: soft non-tender non-distended, no hepatosplenomegaly  Ext: no clubbing cyanosis or edema  MSK: Right 3rd PIP swollen and tender. Otherwise no synovitis, all 4 extremities full ROM without limitation. YAMILET negative bilaterally.  Skin: no rashes and no petechiae  Neuro: alert and oriented X 4, no focal deficits      LABS:                          14.5   7.55  )-----------( 332      ( 12 Aug 2020 12:45 )             42.9         Mean Cell Volume : 94.1 fL  Mean Cell Hemoglobin : 31.8 pg  Mean Cell Hemoglobin Concentration : 33.8 g/dL  Auto Neutrophil # : 4.66 K/uL  Auto Lymphocyte # : 1.97 K/uL  Auto Monocyte # : 0.77 K/uL  Auto Eosinophil # : 0.09 K/uL  Auto Basophil # : 0.03 K/uL  Auto Neutrophil % : 61.7 %  Auto Lymphocyte % : 26.1 %  Auto Monocyte % : 10.2 %  Auto Eosinophil % : 1.2 %  Auto Basophil % : 0.4 %      08-12    138  |  100  |  8<L>  ----------------------------<  111<H>  4.2   |  24  |  0.8    Ca    9.7      12 Aug 2020 12:45  Mg     1.9     08-12    TPro  7.9  /  Alb  4.1  /  TBili  0.3  /  DBili  x   /  AST  16  /  ALT  7   /  AlkPhos  78  08-12                      BLOOD SMEAR INTERPRETATION:       RADIOLOGY & ADDITIONAL STUDIES: Rheumatology consult    Patient is a 34y old  Male who presents with a chief complaint of bilateral knee and elbow pain (13 Aug 2020 15:35)      HPI:  33 yo male no significant past medical history presented to the ED with symptoms of joint pain that started 1-1.5 weeks ago. Rheumatology is asked to see patient for joint pains.     Patient reports he initially started feeling joint pains about 2 years ago. He used to be very active with mountain biking and home exercises like push ups, etc. He noted a difficult break up at the time, and increased ETOH use. He was started on prednisone by his PCP but ultimately was discontinued over long term fear of chronic use and risk of side effects. He was seen in Rheumatology clinic in February 2020 with impression of possible peripheral SpA and plans to start DMARD therapy and obtain MRI L ankle. He reported that two months ago he was symptom free, except for bilateral knee stiffness which he mentions has been bothering him all year. He denies recent infection. He reports symptoms of joint pain in bilateral elbows, knees, ankles, and right hand. He reported falling at home and jamming his right 3rd finger. It became swollen, which has improved. He reports bilateral knee stiffness lasting all day, hes unable to bend his knees, and stiffness/pain in his elbows. He reports being unable to flex his elbows to a certain point. His knee pain progressed to involve his ankles and has had a difficult time walking at home. He reports swelling in his right 3rd finger that involved his hand, but has since improved. He's tried Aleve, Motrin and Tylenol with partial relief. Aleve resulted in lip swelling (showed pictures from phone). Nothing makes his symptoms worse. Patient started on prednisone 20 mg and repots significant improvement in bending knees (was not able to do this prior to admission    He denies uveitis, inflammatory bowel disease, psoriasis, recent infection, or low back stiffness. He was seeing dermatology for ? scalp folliculitis and was prescribed prescription shampoo. He quit ETOH 3 weeks ago.    He denies shortness of breath, cough or rash.    He denies fatigue, unintended weight loss, night sweats, dry eyes, dry mouth, oral ulcers, nasal ulcers, hair loss, sinus problems, nosebleeds, raynaud's, photosensitivity, chest pain, dysphagia, GERD, abdominal pain, nausea, vomiting, diarrhea, constipation, blood in stools, dysuria, hematuria, headaches, dizziness.       Labs show ESR 72, CRP 2.37. CBC, CMP, UA normal and Uric acid 5.1.    Imaging: x-ray of the hands bilaterally showed diffuse swelling of right 3rd PIP, x-ray of elbows and knees are normal.    ROS:  12 point reviewed and negative unless in HPI    PAST MEDICAL & SURGICAL HISTORY:  No pertinent past medical history  No significant past surgical history      SOCIAL HISTORY: denies ETOH or tobacco    FAMILY HISTORY:  Family history unknown: patient reports no known history of major family disease. Especially no joint disease/auto-immune disease.      MEDICATIONS  (STANDING):  chlorhexidine 4% Liquid 1 Application(s) Topical <User Schedule>  enoxaparin Injectable 40 milliGRAM(s) SubCutaneous daily  folic acid 1 milliGRAM(s) Oral daily  multivitamin 1 Tablet(s) Oral daily  predniSONE   Tablet 20 milliGRAM(s) Oral daily  thiamine 100 milliGRAM(s) Oral daily    MEDICATIONS  (PRN):  acetaminophen   Tablet .. 650 milliGRAM(s) Oral every 6 hours PRN Mild Pain (1 - 3)      Allergies    Motrin (Unknown)  naproxen (Anaphylaxis)    Intolerances        Vital Signs Last 24 Hrs  T(C): 35.9 (13 Aug 2020 15:25), Max: 37.1 (12 Aug 2020 23:10)  T(F): 96.6 (13 Aug 2020 15:25), Max: 98.8 (12 Aug 2020 23:10)  HR: 57 (13 Aug 2020 15:25) (51 - 71)  BP: 145/89 (13 Aug 2020 15:25) (112/73 - 145/89)  BP(mean): --  RR: 18 (13 Aug 2020 15:25) (18 - 18)  SpO2: 98% (12 Aug 2020 23:10) (98% - 98%)    PHYSICAL EXAM  General: adult in NAD  HEENT: clear oropharynx, anicteric sclera, pink conjunctiva  Neck: supple  CV: normal S1/S2 with no murmur rubs or gallops  Lungs: positive air movement b/l ant lungs,clear to auscultation, no wheezes, no rales  Abdomen: soft non-tender non-distended, no hepatosplenomegaly  Ext: no clubbing cyanosis or edema  MSK: Right 3rd PIP swollen and tender. Otherwise no synovitis, all 4 extremities full ROM without limitation. YAMILET negative bilaterally.  Skin: no rashes and no petechiae  Neuro: alert and oriented X 4, no focal deficits      LABS:                          14.5   7.55  )-----------( 332      ( 12 Aug 2020 12:45 )             42.9         Mean Cell Volume : 94.1 fL  Mean Cell Hemoglobin : 31.8 pg  Mean Cell Hemoglobin Concentration : 33.8 g/dL  Auto Neutrophil # : 4.66 K/uL  Auto Lymphocyte # : 1.97 K/uL  Auto Monocyte # : 0.77 K/uL  Auto Eosinophil # : 0.09 K/uL  Auto Basophil # : 0.03 K/uL  Auto Neutrophil % : 61.7 %  Auto Lymphocyte % : 26.1 %  Auto Monocyte % : 10.2 %  Auto Eosinophil % : 1.2 %  Auto Basophil % : 0.4 %      08-12    138  |  100  |  8<L>  ----------------------------<  111<H>  4.2   |  24  |  0.8    Ca    9.7      12 Aug 2020 12:45  Mg     1.9     08-12    TPro  7.9  /  Alb  4.1  /  TBili  0.3  /  DBili  x   /  AST  16  /  ALT  7   /  AlkPhos  78  08-12                      BLOOD SMEAR INTERPRETATION:       RADIOLOGY & ADDITIONAL STUDIES:

## 2020-08-13 NOTE — PHYSICAL THERAPY INITIAL EVALUATION ADULT - SPECIFY REASON(S)
Pt requesting PT come back later as pt is very tired and has been trying to sleep but is constantly woken up by healthcare team. Pt agreeable for PT to f/u in pm.

## 2020-08-13 NOTE — PHYSICAL THERAPY INITIAL EVALUATION ADULT - MANUAL MUSCLE TESTING RESULTS, REHAB EVAL
b/l UE grossly WFL t/o in available range, R knee ext grossly 3+/5, R knee flexion 5/5, R hip flexion 4/5, R ankle DF/PF WFL, LLE grossly WFL t/o

## 2020-08-13 NOTE — DISCHARGE NOTE PROVIDER - NSDCCPCAREPLAN_GEN_ALL_CORE_FT
PRINCIPAL DISCHARGE DIAGNOSIS  Diagnosis: Polyarthralgia  Assessment and Plan of Treatment: Your joint pain is secondary to an inflammatory arthiritis of unclear etiology currently. The possibilites are either seronegative rheumatoid arthiritis vs. spondyloarthropathy vs. sarcoidosis. More work up will be needed and follow up with the rheumatologist is essential. Please continue taking your oral prednisone.      SECONDARY DISCHARGE DIAGNOSES  Diagnosis: Excessive drinking alcohol  Assessment and Plan of Treatment: We have counselled you on continued cessation of alcohol consumption. Also continue taking multivitamin, folate and thiamine.

## 2020-08-14 ENCOUNTER — TRANSCRIPTION ENCOUNTER (OUTPATIENT)
Age: 35
End: 2020-08-14

## 2020-08-14 VITALS — HEIGHT: 72 IN | WEIGHT: 165.57 LBS

## 2020-08-14 LAB
SARS-COV-2 IGG SERPL QL IA: NEGATIVE — SIGNIFICANT CHANGE UP
SARS-COV-2 IGM SERPL IA-ACNC: 0.02 INDEX — SIGNIFICANT CHANGE UP

## 2020-08-14 PROCEDURE — 99232 SBSQ HOSP IP/OBS MODERATE 35: CPT

## 2020-08-14 PROCEDURE — 99239 HOSP IP/OBS DSCHRG MGMT >30: CPT

## 2020-08-14 RX ORDER — FOLIC ACID 0.8 MG
1 TABLET ORAL
Qty: 0 | Refills: 0 | DISCHARGE
Start: 2020-08-14

## 2020-08-14 RX ORDER — FOLIC ACID 0.8 MG
1 TABLET ORAL
Qty: 30 | Refills: 0
Start: 2020-08-14 | End: 2020-09-12

## 2020-08-14 RX ORDER — THIAMINE MONONITRATE (VIT B1) 100 MG
1 TABLET ORAL
Qty: 0 | Refills: 0 | DISCHARGE
Start: 2020-08-14

## 2020-08-14 RX ORDER — THIAMINE MONONITRATE (VIT B1) 100 MG
1 TABLET ORAL
Qty: 30 | Refills: 0
Start: 2020-08-14 | End: 2020-09-12

## 2020-08-14 RX ADMIN — Medication 650 MILLIGRAM(S): at 03:12

## 2020-08-14 RX ADMIN — Medication 100 MILLIGRAM(S): at 12:33

## 2020-08-14 RX ADMIN — Medication 1 TABLET(S): at 12:34

## 2020-08-14 RX ADMIN — Medication 20 MILLIGRAM(S): at 05:57

## 2020-08-14 RX ADMIN — Medication 650 MILLIGRAM(S): at 04:12

## 2020-08-14 RX ADMIN — Medication 1 MILLIGRAM(S): at 12:34

## 2020-08-14 NOTE — PROGRESS NOTE ADULT - ASSESSMENT
HPI:  33 yo male w/ PMHx of persistent joint pain presents c/o bilateral knee, ankle and elbow pain. Patient has been having off and on joint pain for just over one year. Pain has mostly been in both knees and both ankles. He would have morning stiffness that lasted one to one and half hours and then would get better after being active. Throughout the day the stiffness would be better then in the evening he would have throbbing pain in both knees and ankles. He was given a course of steroids earlier this year in February/March which gave him some relief however he has been off steroids since end of March. Dose was 20mg. He was referred to rheumatology (Dr. Duarte) where extensive auto-immune workup was done. Ultimately so far the workup has been consistent with a peripheral spondyloarthropathy. Consideration was given to starting methotrexate however given that patient was a heavier drinker of alcohol he was not a candidate for MTX. MRI of foot/ankle was ordered, patient was to lower prednisone dose and to follow up in 2 months. However patient was lost to follow up. Over time his symptoms were stable however since one week ago he has had significant worsening in his symptoms. He has had throbbing pain in his knees and ankles/feet. Pain has been constant throughout the day with no relief. He has mostly been staying on the couch and has been reliant on his significant other for assistance with daily activities. Additionally five days ago he started to have throbbing pain and stiffness in both elbows - something that has not happened before. He has also had falls secondary to his limited mobility causing him to stub his right third finger. Denies any head trauma/LOC/headache, fevers, chill, headache, recent illness/travel, cough, urinary/bowel symptoms, abdominal pain, chest pain, or SOB. (12 Aug 2020 15:58)    #Polyarthropathy   rheumatology consult pending   no clear source identified but elevated esr and crp   reviewed images of suspected patient's reactions to nsaid where lips swelled while consuming alcohol, but on repeat nsaid challenge did not swell when he ceased ethanol consumption    #Ethanol abuse- ethanol abuse  counseling spent 10 additional minutes counseling patient     #Suspected thiamine and folate deficiency- cw supplementation     Progress Note Handoff    Pending:  rheumatology consult     Family discussion: patient verbalized understanding and agreeable to plan of care     Disposition: Home___
34 year old healthy male presented for symptoms of joint pain. Rheumatology has been consulted for joint pains.      1. Multiple joint pain    -Elbows, knees, ankles and right hand associated with stiffness lasting all day, better with prednisone, and worse when taken off prednisone. Symptoms are suspicious for inflammatory arthriits  -In the past RF, CCP and MEENA negative  -Differential diagnosis includes seronegative RA, peripheral SpA (? dactylitis related to trauma), sarcoidosis (young AA male)    Recommendations:    -Continue prednisone 20 mg daily and follow up as outpatient in 1-2 weeks  -Check CXR, ACE level, lysozyme for evaluation of sarcoidosis  - Also check Hep B surface Ab.   - Plan discussed with Rheumatology attending.
HPI:  35 yo male w/ PMHx of persistent joint pain presents c/o bilateral knee, ankle and elbow pain. Patient has been having off and on joint pain for just over one year. Pain has mostly been in both knees and both ankles. He would have morning stiffness that lasted one to one and half hours and then would get better after being active. Throughout the day the stiffness would be better then in the evening he would have throbbing pain in both knees and ankles. He was given a course of steroids earlier this year in February/March which gave him some relief however he has been off steroids since end of March. Dose was 20mg. He was referred to rheumatology (Dr. Duarte) where extensive auto-immune workup was done. Ultimately so far the workup has been consistent with a peripheral spondyloarthropathy. Consideration was given to starting methotrexate however given that patient was a heavier drinker of alcohol he was not a candidate for MTX. MRI of foot/ankle was ordered, patient was to lower prednisone dose and to follow up in 2 months. However patient was lost to follow up. Over time his symptoms were stable however since one week ago he has had significant worsening in his symptoms. He has had throbbing pain in his knees and ankles/feet. Pain has been constant throughout the day with no relief. He has mostly been staying on the couch and has been reliant on his significant other for assistance with daily activities. Additionally five days ago he started to have throbbing pain and stiffness in both elbows - something that has not happened before. He has also had falls secondary to his limited mobility causing him to stub his right third finger. Denies any head trauma/LOC/headache, fevers, chill, headache, recent illness/travel, cough, urinary/bowel symptoms, abdominal pain, chest pain, or SOB. (12 Aug 2020 15:58)    #Polyarthropathy secondary to suspected sarcoidosis     appreciate rheumatology consult   prednisone and follow up outpatient with Rheumatology for further evaluation/treatment / review labs    #Ethanol abuse- ethanol abuse  counseled     #Suspected thiamine and folate deficiency- cw supplementation     Progress Note Handoff    Pending:  dc home , time spent 40 min     Family discussion: patient verbalized understanding and agreeable to plan of care     Disposition: Home___

## 2020-08-14 NOTE — DISCHARGE NOTE NURSING/CASE MANAGEMENT/SOCIAL WORK - PATIENT PORTAL LINK FT
You can access the FollowMyHealth Patient Portal offered by [facility] by registering at the following website: [email or web address]. By joining [facility]’s FollowMyHealth portal, you will also be able to view your health information using other applications (apps) compatible with our system.

## 2020-08-14 NOTE — PHARMACOTHERAPY INTERVENTION NOTE - COMMENTS
counseled patient on prednisone 20 mg. Spoke about side effects such as increasing blood pressure and increase glucose. Also spoke about potential mood disturbances and weight gain. Patient said he took it before but suddenly stopped taking it. We talked about the importance of abrupt discontinuation and mentioned that the duration is only until he sees his rheumatologist addressing his joint swelling.

## 2020-08-14 NOTE — PROGRESS NOTE ADULT - SUBJECTIVE AND OBJECTIVE BOX
KANA LIZETT  34y  [facility] F4-4B 0[address]      Patient is a 34y old  Male who presents with a chief complaint of bilateral knee and elbow pain (14 Aug 2020 09:52)      INTERVAL HPI/OVERNIGHT EVENTS:    rheumatology evaluated patient ,no events overnight , ambulating without difficulty     REVIEW OF SYSTEMS:  CONSTITUTIONAL: No fever, weight loss, or fatigue  EYES: No eye pain, visual disturbances, or discharge  ENMT:  No difficulty hearing, tinnitus, vertigo; No sinus or throat pain  NECK: No pain or stiffness  BREASTS: No pain, masses, or nipple discharge  RESPIRATORY: No cough, wheezing, chills or hemoptysis; No shortness of breath  CARDIOVASCULAR: No chest pain, palpitations, dizziness, or leg swelling  GASTROINTESTINAL: No abdominal or epigastric pain. No nausea, vomiting, or hematemesis; No diarrhea or constipation. No melena or hematochezia.  GENITOURINARY: No dysuria, frequency, hematuria, or incontinence  NEUROLOGICAL: No headaches, memory loss, loss of strength, numbness, or tremors  SKIN: No itching, burning, rashes, or lesions   LYMPH NODES: No enlarged glands  ENDOCRINE: No heat or cold intolerance; No hair loss  MUSCULOSKELETAL: No joint pain or swelling; No muscle, back, or extremity pain  PSYCHIATRIC: No depression, anxiety, mood swings, or difficulty sleeping  HEME/LYMPH: No easy bruising, or bleeding gums  ALLERY AND IMMUNOLOGIC: No hives or eczema  FAMILY HISTORY:  Family history unknown: patient reports no known history of major family disease. Especially no joint disease/auto-immune disease.    T(C): 36 (08-14-20 @ 07:23), Max: 36.6 (08-14-20 @ 00:15)  HR: 51 (08-14-20 @ 07:23) (51 - 57)  BP: 119/76 (08-14-20 @ 07:23) (105/65 - 145/89)  RR: 18 (08-14-20 @ 07:23) (18 - 20)  SpO2: --  Wt(kg): --Vital Signs Last 24 Hrs  T(C): 36 (14 Aug 2020 07:23), Max: 36.6 (14 Aug 2020 00:15)  T(F): 96.8 (14 Aug 2020 07:23), Max: 97.8 (14 Aug 2020 00:15)  HR: 51 (14 Aug 2020 07:23) (51 - 57)  BP: 119/76 (14 Aug 2020 07:23) (105/65 - 145/89)  BP(mean): --  RR: 18 (14 Aug 2020 07:23) (18 - 20)  SpO2: --    PHYSICAL EXAM:  GENERAL: NAD, well-groomed, well-developed  HEAD:  Atraumatic, Normocephalic  EYES: EOMI, PERRLA, conjunctiva and sclera clear  ENMT: No tonsillar erythema, exudates, or enlargement; Moist mucous membranes, Good dentition, No lesions  NECK: Supple, No JVD, Normal thyroid  NERVOUS SYSTEM:  Alert & Oriented X3, Good concentration; Motor Strength 5/5 B/L upper and lower extremities; DTRs 2+ intact and symmetric  PULM: Clear to auscultation bilaterally  CARDIAC: Regular rate and rhythm; No murmurs, rubs, or gallops  GI: Soft, Nontender, Nondistended; Bowel sounds present  EXTREMITIES:  2+ Peripheral Pulses, No clubbing, cyanosis, or edema  LYMPH: No lymphadenopathy noted  SKIN: No rashes or lesions    Consultant(s) Notes Reviewed:  [x ] YES  [ ] NO  Care Discussed with Consultants/Other Providers [ x] YES  [ ] NO    LABS:                            14.5   7.55  )-----------( 332      ( 12 Aug 2020 12:45 )             42.9   08-12    138  |  100  |  8<L>  ----------------------------<  111<H>  4.2   |  24  |  0.8    Ca    9.7      12 Aug 2020 12:45  Mg     1.9     08-12    TPro  7.9  /  Alb  4.1  /  TBili  0.3  /  DBili  x   /  AST  16  /  ALT  7   /  AlkPhos  78  08-12            Culture - Urine (collected 12 Aug 2020 13:42)  Source: .Urine Clean Catch (Midstream)  Final Report (13 Aug 2020 16:17):    <10,000 CFU/mL Normal Urogenital Kamla      acetaminophen   Tablet .. 650 milliGRAM(s) Oral every 6 hours PRN  chlorhexidine 4% Liquid 1 Application(s) Topical <User Schedule>  enoxaparin Injectable 40 milliGRAM(s) SubCutaneous daily  folic acid 1 milliGRAM(s) Oral daily  multivitamin 1 Tablet(s) Oral daily  predniSONE   Tablet 20 milliGRAM(s) Oral daily  thiamine 100 milliGRAM(s) Oral daily      HEALTH ISSUES - PROBLEM Dx:          Case Discussed with House Staff     Spectra [phone]
KANA LIZETT  34y  [facility] F4-4B 020 B      Patient is a 34y old  Male who presents with a chief complaint of bilateral knee and elbow pain (12 Aug 2020 15:58)      INTERVAL HPI/OVERNIGHT EVENTS:      since yesterday , patient is able to walk   REVIEW OF SYSTEMS:  CONSTITUTIONAL: No fever, weight loss, or fatigue  EYES: No eye pain, visual disturbances, or discharge  ENMT:  No difficulty hearing, tinnitus, vertigo; No sinus or throat pain  NECK: No pain or stiffness  BREASTS: No pain, masses, or nipple discharge  RESPIRATORY: No cough, wheezing, chills or hemoptysis; No shortness of breath  CARDIOVASCULAR: No chest pain, palpitations, dizziness, or leg swelling  GASTROINTESTINAL: No abdominal or epigastric pain. No nausea, vomiting, or hematemesis; No diarrhea or constipation. No melena or hematochezia.  GENITOURINARY: No dysuria, frequency, hematuria, or incontinence  NEUROLOGICAL: No headaches, memory loss, loss of strength, numbness, or tremors  SKIN: No itching, burning, rashes, or lesions   LYMPH NODES: No enlarged glands  ENDOCRINE: No heat or cold intolerance; No hair loss  MUSCULOSKELETAL: No joint pain or swelling; No muscle, back, or extremity pain  PSYCHIATRIC: No depression, anxiety, mood swings, or difficulty sleeping  HEME/LYMPH: No easy bruising, or bleeding gums  ALLERY AND IMMUNOLOGIC: No hives or eczema  FAMILY HISTORY:  Family history unknown: patient reports no known history of major family disease. Especially no joint disease/auto-immune disease.    T(C): 35.8 (08-13-20 @ 07:30), Max: 37.1 (08-12-20 @ 23:10)  HR: 56 (08-13-20 @ 07:30) (51 - 71)  BP: 128/70 (08-13-20 @ 07:30) (112/73 - 134/94)  RR: 18 (08-13-20 @ 07:30) (18 - 18)  SpO2: 98% (08-12-20 @ 23:10) (98% - 98%)  Wt(kg): --Vital Signs Last 24 Hrs  T(C): 35.8 (13 Aug 2020 07:30), Max: 37.1 (12 Aug 2020 23:10)  T(F): 96.4 (13 Aug 2020 07:30), Max: 98.8 (12 Aug 2020 23:10)  HR: 56 (13 Aug 2020 07:30) (51 - 71)  BP: 128/70 (13 Aug 2020 07:30) (112/73 - 134/94)  BP(mean): --  RR: 18 (13 Aug 2020 07:30) (18 - 18)  SpO2: 98% (12 Aug 2020 23:10) (98% - 98%)    PHYSICAL EXAM:  GENERAL: NAD, well-groomed, well-developed  HEAD:  Atraumatic, Normocephalic  EYES: EOMI, PERRLA, conjunctiva and sclera clear  ENMT: No tonsillar erythema, exudates, or enlargement; Moist mucous membranes, Good dentition, No lesions  NECK: Supple, No JVD, Normal thyroid  NERVOUS SYSTEM:  Alert & Oriented X3, Good concentration; Motor Strength 5/5 B/L upper and lower extremities; DTRs 2+ intact and symmetric  PULM: Clear to auscultation bilaterally  CARDIAC: Regular rate and rhythm; No murmurs, rubs, or gallops  GI: Soft, Nontender, Nondistended; Bowel sounds present  EXTREMITIES:  2+ Peripheral Pulses, No clubbing, cyanosis, or edema  LYMPH: No lymphadenopathy noted  SKIN: No rashes or lesions    Consultant(s) Notes Reviewed:  [x ] YES  [ ] NO  Care Discussed with Consultants/Other Providers [ x] YES  [ ] NO    LABS:                            14.5   7.55  )-----------( 332      ( 12 Aug 2020 12:45 )             42.9   08-12    138  |  100  |  8<L>  ----------------------------<  111<H>  4.2   |  24  |  0.8    Ca    9.7      12 Aug 2020 12:45  Mg     1.9     08-12    TPro  7.9  /  Alb  4.1  /  TBili  0.3  /  DBili  x   /  AST  16  /  ALT  7   /  AlkPhos  78  08-12            acetaminophen   Tablet .. 650 milliGRAM(s) Oral every 6 hours PRN  chlorhexidine 4% Liquid 1 Application(s) Topical <User Schedule>  enoxaparin Injectable 40 milliGRAM(s) SubCutaneous daily  multivitamin 1 Tablet(s) Oral daily  predniSONE   Tablet 20 milliGRAM(s) Oral daily      HEALTH ISSUES - PROBLEM Dx:          Case Discussed with House Staff      Spectra [phone]
LIZETT ROGER  [id number]  Male  34y  HPI:  35 yo male w/ PMHx of persistent joint pain presents c/o bilateral knee, ankle and elbow pain. Patient has been having off and on joint pain for just over one year. Pain has mostly been in both knees and both ankles. He would have morning stiffness that lasted one to one and half hours and then would get better after being active. Throughout the day the stiffness would be better then in the evening he would have throbbing pain in both knees and ankles. He was given a course of steroids earlier this year in February/March which gave him some relief however he has been off steroids since end of March. Dose was 20mg. He was referred to rheumatology (Dr. Duarte) where extensive auto-immune workup was done. Ultimately so far the workup has been consistent with a peripheral spondyloarthropathy. Consideration was given to starting methotrexate however given that patient was a heavier drinker of alcohol he was not a candidate for MTX. MRI of foot/ankle was ordered, patient was to lower prednisone dose and to follow up in 2 months. However patient was lost to follow up. Over time his symptoms were stable however since one week ago he has had significant worsening in his symptoms. He has had throbbing pain in his knees and ankles/feet. Pain has been constant throughout the day with no relief. He has mostly been staying on the couch and has been reliant on his significant other for assistance with daily activities. Additionally five days ago he started to have throbbing pain and stiffness in both elbows - something that has not happened before. He has also had falls secondary to his limited mobility causing him to stub his right third finger. Denies any head trauma/LOC/headache, fevers, chill, headache, recent illness/travel, cough, urinary/bowel symptoms, abdominal pain, chest pain, or SOB. (12 Aug 2020 15:58)    PAST MEDICAL & SURGICAL HISTORY:  No pertinent past medical history  No significant past surgical history    FAMILY HISTORY:  Family history unknown: patient reports no known history of major family disease. Especially no joint disease/auto-immune disease.    MEDICATIONS  (STANDING):  chlorhexidine 4% Liquid 1 Application(s) Topical <User Schedule>  enoxaparin Injectable 40 milliGRAM(s) SubCutaneous daily  folic acid 1 milliGRAM(s) Oral daily  multivitamin 1 Tablet(s) Oral daily  predniSONE   Tablet 20 milliGRAM(s) Oral daily  thiamine 100 milliGRAM(s) Oral daily    MEDICATIONS  (PRN):  acetaminophen   Tablet .. 650 milliGRAM(s) Oral every 6 hours PRN Mild Pain (1 - 3)    Allergies    Motrin (Unknown)  naproxen (Anaphylaxis)    Intolerances      REVIEW OF SYSTEMS  see HPI    Allergic/Immunologic:	  PHYSICAL EXAM:    General: adult in NAD  HEENT: clear oropharynx, anicteric sclera, pink conjunctiva  Neck: supple  CV: normal S1/S2 with no murmur rubs or gallops  Lungs: positive air movement b/l ant lungs,clear to auscultation, no wheezes, no rales  Abdomen: soft non-tender non-distended, no hepatosplenomegaly  Ext: no clubbing cyanosis or edema  MSK: Right 3rd PIP swollen and tender. Otherwise no synovitis, all 4 extremities full ROM without limitation. YAMILET negative bilaterally.  Skin: no rashes and no petechiae  Neuro: alert and oriented X 4, no focal deficits        CBC Full  -  ( 12 Aug 2020 12:45 )  WBC Count : 7.55 K/uL  RBC Count : 4.56 M/uL  Hemoglobin : 14.5 g/dL  Hematocrit : 42.9 %  Platelet Count - Automated : 332 K/uL  Mean Cell Volume : 94.1 fL  Mean Cell Hemoglobin : 31.8 pg  Mean Cell Hemoglobin Concentration : 33.8 g/dL  Auto Neutrophil # : 4.66 K/uL  Auto Lymphocyte # : 1.97 K/uL  Auto Monocyte # : 0.77 K/uL  Auto Eosinophil # : 0.09 K/uL  Auto Basophil # : 0.03 K/uL  Auto Neutrophil % : 61.7 %  Auto Lymphocyte % : 26.1 %  Auto Monocyte % : 10.2 %  Auto Eosinophil % : 1.2 %  Auto Basophil % : 0.4 %    LIVER FUNCTIONS - ( 12 Aug 2020 12:45 )  Alb: 4.1 g/dL / Pro: 7.9 g/dL / ALK PHOS: 78 U/L / ALT: 7 U/L / AST: 16 U/L / GGT: x           08-12    138  |  100  |  8<L>  ----------------------------<  111<H>  4.2   |  24  |  0.8    Ca    9.7      12 Aug 2020 12:45  Mg     1.9     08-12    TPro  7.9  /  Alb  4.1  /  TBili  0.3  /  DBili  x   /  AST  16  /  ALT  7   /  AlkPhos  78  08-12

## 2020-08-15 LAB — HBV SURFACE AB SER-ACNC: REACTIVE

## 2020-08-16 LAB — ACE SERPL-CCNC: 30 U/L — SIGNIFICANT CHANGE UP (ref 14–82)

## 2020-08-17 LAB — HLA-B27 QL: SIGNIFICANT CHANGE UP

## 2020-08-18 DIAGNOSIS — M06.4 INFLAMMATORY POLYARTHROPATHY: ICD-10-CM

## 2020-08-18 DIAGNOSIS — E53.8 DEFICIENCY OF OTHER SPECIFIED B GROUP VITAMINS: ICD-10-CM

## 2020-08-18 DIAGNOSIS — F10.10 ALCOHOL ABUSE, UNCOMPLICATED: ICD-10-CM

## 2020-08-18 DIAGNOSIS — E51.9 THIAMINE DEFICIENCY, UNSPECIFIED: ICD-10-CM

## 2020-08-18 LAB — LYSOZYME SERPL-MCNC: 5.6 MCG/ML — SIGNIFICANT CHANGE UP (ref 5–11)

## 2020-08-26 NOTE — ED PROVIDER NOTE - NS_EDPROVIDERDISPOUSERTYPE_ED_A_ED
unk
I have personally evaluated and examined the patient. The Attending was available to me as a supervising provider if needed.

## 2020-09-01 ENCOUNTER — OUTPATIENT (OUTPATIENT)
Dept: OUTPATIENT SERVICES | Facility: HOSPITAL | Age: 35
LOS: 1 days | End: 2020-09-01
Payer: MEDICAID

## 2020-09-12 ENCOUNTER — INPATIENT (INPATIENT)
Facility: HOSPITAL | Age: 35
LOS: 0 days | Discharge: HOME | End: 2020-09-13
Attending: HOSPITALIST | Admitting: HOSPITALIST
Payer: MEDICAID

## 2020-09-12 VITALS
WEIGHT: 169.98 LBS | HEART RATE: 84 BPM | HEIGHT: 72 IN | OXYGEN SATURATION: 100 % | DIASTOLIC BLOOD PRESSURE: 70 MMHG | TEMPERATURE: 98 F | SYSTOLIC BLOOD PRESSURE: 154 MMHG | RESPIRATION RATE: 18 BRPM

## 2020-09-12 DIAGNOSIS — Z88.8 ALLERGY STATUS TO OTHER DRUGS, MEDICAMENTS AND BIOLOGICAL SUBSTANCES: ICD-10-CM

## 2020-09-12 LAB
ALBUMIN SERPL ELPH-MCNC: 4.3 G/DL — SIGNIFICANT CHANGE UP (ref 3.5–5.2)
ALP SERPL-CCNC: 53 U/L — SIGNIFICANT CHANGE UP (ref 30–115)
ALT FLD-CCNC: 7 U/L — SIGNIFICANT CHANGE UP (ref 0–41)
ANION GAP SERPL CALC-SCNC: 12 MMOL/L — SIGNIFICANT CHANGE UP (ref 7–14)
AST SERPL-CCNC: 14 U/L — SIGNIFICANT CHANGE UP (ref 0–41)
BASOPHILS # BLD AUTO: 0.03 K/UL — SIGNIFICANT CHANGE UP (ref 0–0.2)
BASOPHILS NFR BLD AUTO: 0.3 % — SIGNIFICANT CHANGE UP (ref 0–1)
BILIRUB SERPL-MCNC: 0.4 MG/DL — SIGNIFICANT CHANGE UP (ref 0.2–1.2)
BUN SERPL-MCNC: 10 MG/DL — SIGNIFICANT CHANGE UP (ref 10–20)
CALCIUM SERPL-MCNC: 9.7 MG/DL — SIGNIFICANT CHANGE UP (ref 8.5–10.1)
CHLORIDE SERPL-SCNC: 98 MMOL/L — SIGNIFICANT CHANGE UP (ref 98–110)
CO2 SERPL-SCNC: 25 MMOL/L — SIGNIFICANT CHANGE UP (ref 17–32)
CREAT SERPL-MCNC: 0.8 MG/DL — SIGNIFICANT CHANGE UP (ref 0.7–1.5)
EOSINOPHIL # BLD AUTO: 0.14 K/UL — SIGNIFICANT CHANGE UP (ref 0–0.7)
EOSINOPHIL NFR BLD AUTO: 1.3 % — SIGNIFICANT CHANGE UP (ref 0–8)
ERYTHROCYTE [SEDIMENTATION RATE] IN BLOOD: 11 MM/HR — HIGH (ref 0–10)
GLUCOSE SERPL-MCNC: 103 MG/DL — HIGH (ref 70–99)
HCT VFR BLD CALC: 45.6 % — SIGNIFICANT CHANGE UP (ref 42–52)
HGB BLD-MCNC: 15 G/DL — SIGNIFICANT CHANGE UP (ref 14–18)
IMM GRANULOCYTES NFR BLD AUTO: 0.8 % — HIGH (ref 0.1–0.3)
LACTATE SERPL-SCNC: 1.1 MMOL/L — SIGNIFICANT CHANGE UP (ref 0.7–2)
LYMPHOCYTES # BLD AUTO: 2.26 K/UL — SIGNIFICANT CHANGE UP (ref 1.2–3.4)
LYMPHOCYTES # BLD AUTO: 21 % — SIGNIFICANT CHANGE UP (ref 20.5–51.1)
MCHC RBC-ENTMCNC: 30.8 PG — SIGNIFICANT CHANGE UP (ref 27–31)
MCHC RBC-ENTMCNC: 32.9 G/DL — SIGNIFICANT CHANGE UP (ref 32–37)
MCV RBC AUTO: 93.6 FL — SIGNIFICANT CHANGE UP (ref 80–94)
MONOCYTES # BLD AUTO: 0.98 K/UL — HIGH (ref 0.1–0.6)
MONOCYTES NFR BLD AUTO: 9.1 % — SIGNIFICANT CHANGE UP (ref 1.7–9.3)
NEUTROPHILS # BLD AUTO: 7.25 K/UL — HIGH (ref 1.4–6.5)
NEUTROPHILS NFR BLD AUTO: 67.5 % — SIGNIFICANT CHANGE UP (ref 42.2–75.2)
NRBC # BLD: 0 /100 WBCS — SIGNIFICANT CHANGE UP (ref 0–0)
PLATELET # BLD AUTO: 212 K/UL — SIGNIFICANT CHANGE UP (ref 130–400)
POTASSIUM SERPL-MCNC: 4.2 MMOL/L — SIGNIFICANT CHANGE UP (ref 3.5–5)
POTASSIUM SERPL-SCNC: 4.2 MMOL/L — SIGNIFICANT CHANGE UP (ref 3.5–5)
PROT SERPL-MCNC: 7.7 G/DL — SIGNIFICANT CHANGE UP (ref 6–8)
RBC # BLD: 4.87 M/UL — SIGNIFICANT CHANGE UP (ref 4.7–6.1)
RBC # FLD: 14.1 % — SIGNIFICANT CHANGE UP (ref 11.5–14.5)
SODIUM SERPL-SCNC: 135 MMOL/L — SIGNIFICANT CHANGE UP (ref 135–146)
WBC # BLD: 10.75 K/UL — SIGNIFICANT CHANGE UP (ref 4.8–10.8)
WBC # FLD AUTO: 10.75 K/UL — SIGNIFICANT CHANGE UP (ref 4.8–10.8)

## 2020-09-12 PROCEDURE — 99223 1ST HOSP IP/OBS HIGH 75: CPT | Mod: AI

## 2020-09-12 PROCEDURE — 73100 X-RAY EXAM OF WRIST: CPT | Mod: 26,RT

## 2020-09-12 PROCEDURE — 73130 X-RAY EXAM OF HAND: CPT | Mod: 26,RT

## 2020-09-12 PROCEDURE — 73080 X-RAY EXAM OF ELBOW: CPT | Mod: 26,RT

## 2020-09-12 PROCEDURE — 99285 EMERGENCY DEPT VISIT HI MDM: CPT

## 2020-09-12 RX ORDER — VANCOMYCIN HCL 1 G
1000 VIAL (EA) INTRAVENOUS ONCE
Refills: 0 | Status: COMPLETED | OUTPATIENT
Start: 2020-09-12 | End: 2020-09-12

## 2020-09-12 RX ORDER — VANCOMYCIN HCL 1 G
1250 VIAL (EA) INTRAVENOUS EVERY 12 HOURS
Refills: 0 | Status: DISCONTINUED | OUTPATIENT
Start: 2020-09-12 | End: 2020-09-13

## 2020-09-12 RX ORDER — CEFTRIAXONE 500 MG/1
1000 INJECTION, POWDER, FOR SOLUTION INTRAMUSCULAR; INTRAVENOUS EVERY 24 HOURS
Refills: 0 | Status: DISCONTINUED | OUTPATIENT
Start: 2020-09-12 | End: 2020-09-13

## 2020-09-12 RX ORDER — OXYCODONE AND ACETAMINOPHEN 5; 325 MG/1; MG/1
1 TABLET ORAL EVERY 6 HOURS
Refills: 0 | Status: DISCONTINUED | OUTPATIENT
Start: 2020-09-12 | End: 2020-09-13

## 2020-09-12 RX ORDER — CEFTRIAXONE 500 MG/1
1000 INJECTION, POWDER, FOR SOLUTION INTRAMUSCULAR; INTRAVENOUS ONCE
Refills: 0 | Status: COMPLETED | OUTPATIENT
Start: 2020-09-12 | End: 2020-09-12

## 2020-09-12 RX ORDER — ENOXAPARIN SODIUM 100 MG/ML
40 INJECTION SUBCUTANEOUS DAILY
Refills: 0 | Status: DISCONTINUED | OUTPATIENT
Start: 2020-09-12 | End: 2020-09-13

## 2020-09-12 RX ORDER — KETOROLAC TROMETHAMINE 30 MG/ML
30 SYRINGE (ML) INJECTION ONCE
Refills: 0 | Status: DISCONTINUED | OUTPATIENT
Start: 2020-09-12 | End: 2020-09-12

## 2020-09-12 RX ADMIN — Medication 10 MILLIGRAM(S): at 15:17

## 2020-09-12 RX ADMIN — Medication 10 MILLIGRAM(S): at 13:32

## 2020-09-12 RX ADMIN — Medication 30 MILLIGRAM(S): at 15:17

## 2020-09-12 RX ADMIN — CEFTRIAXONE 100 MILLIGRAM(S): 500 INJECTION, POWDER, FOR SOLUTION INTRAMUSCULAR; INTRAVENOUS at 18:34

## 2020-09-12 RX ADMIN — Medication 100 MILLIGRAM(S): at 18:34

## 2020-09-12 NOTE — ED PROVIDER NOTE - PATIENT PORTAL LINK FT
You can access the FollowMyHealth Patient Portal offered by [facility] by registering at the following website: [email or web address]. By joining [employer]’s FollowMyHealth portal, you will also be able to view your health information using other applications (apps) compatible with our system.

## 2020-09-12 NOTE — H&P ADULT - ATTENDING COMMENTS
33 YO M with a PMH of right middle finger pain and swelling worsening over the past x 1 week. Associated with migratory polyarthralgia that has been ongoing for several months. No recent trauma, IVDA, unprotected sex, fevers/chills, or rashes. Of note, pt was recently admitted for migratory polyarthralgia and had a negative workup, was give PO prednisone and an out-pt Rheum appointment. In the ED, an X-Ray of the right hand was read as septic arthritis and the pt had cultures drawn, and was started on IV ABXs (Ceftriaxone/Vanco).     Physical exam shows pt in NAD. VSS, afebrile, not hypoxic on RA. A&Ox3. Non-focal neuro exam. Muscle strength/sensation intact. CTA B/L with no W/C/R. RRR, no M/G/R. ABD is soft and non-tender, normoactive BSs. Right middle finger with swelling/warmth of the PIP, no TTP and no fluctuance, no obvious signs of trauma. No rashes. Labs and radiology as above.     Right middle finger swelling/pain of PIP, non-traumatic, pseudogout? rule out rheumatological, doubt septic arthritis. Rheum/ID consult. C/w IV ABXs for now. FU cultures. ESR/CRP. Send urine G/C. Would likely benefit from arthrocentesis. PRN pain meds.     Restart home meds. DVT PPX. Inform PCP of pt's admission to hospital. My note supersedes the residents note.

## 2020-09-12 NOTE — ED PROVIDER NOTE - PHYSICAL EXAMINATION
Gen: NAD, AOx3  Head: NCAT  HEENT: PERRL, oral mucosa moist, normal conjunctiva, oropharynx clear without exudate or erythema  Lung: CTAB, no respiratory distress, no wheezing, rales, rhonchi  CV: normal s1/s2, rrr, Normal perfusion, pulses 2+ throughout  Abd: soft, NTND, no CVA tenderness  Genitourinary: no pelvic tenderness  MSK: no visible deformities, RUE: 2+ pulse, edema/erythema to 3rd digit w/ limited ROM, TTP to wrist/elbow w/ minimal edema   Neuro: No focal neurologic deficits  Skin: No rash   Psych: normal affect

## 2020-09-12 NOTE — H&P ADULT - NSHPLABSRESULTS_GEN_ALL_CORE
CBC Full  -  ( 12 Sep 2020 16:05 )  WBC Count : 10.75 K/uL  RBC Count : 4.87 M/uL  Hemoglobin : 15.0 g/dL  Hematocrit : 45.6 %  Platelet Count - Automated : 212 K/uL  Mean Cell Volume : 93.6 fL  Mean Cell Hemoglobin : 30.8 pg  Mean Cell Hemoglobin Concentration : 32.9 g/dL  Auto Neutrophil # : 7.25 K/uL  Auto Lymphocyte # : 2.26 K/uL  Auto Monocyte # : 0.98 K/uL  Auto Eosinophil # : 0.14 K/uL  Auto Basophil # : 0.03 K/uL  Auto Neutrophil % : 67.5 %  Auto Lymphocyte % : 21.0 %  Auto Monocyte % : 9.1 %  Auto Eosinophil % : 1.3 %  Auto Basophil % : 0.3 %    09-12    135  |  98  |  10  ----------------------------<  103<H>  4.2   |  25  |  0.8    Ca    9.7      12 Sep 2020 16:05    TPro  7.7  /  Alb  4.3  /  TBili  0.4  /  DBili  x   /  AST  14  /  ALT  7   /  AlkPhos  53  09-12

## 2020-09-12 NOTE — H&P ADULT - ASSESSMENT
33 yo male w/ PMHx of persistent joint pain presents c/o bilateral knee, ankle and elbow pain. Patient has been having off and on joint pain for just over one year. Pain has mostly been in both knees and both ankles. He would have morning stiffness that lasted one to one and half hours and then would get better after being active. Throughout the day the stiffness would be better then in the evening he would have throbbing pain in both knees and ankles. He was given a course of steroids earlier this year in February/March which gave him some relief however he has been off steroids since end of March. Dose was 20mg. He was referred to rheumatology (Dr. Duarte) where extensive auto-immune workup was done. Ultimately so far the workup has been consistent with a peripheral spondyloarthropathy. Now pt presenting to ED with Right 3rd  PIP swelling along with pain in entire hand. Pain  is achy constant,  no trauma, numbness, weakness. no , fever, chills. Pt  states ran out of prednisone yesterday and that's they only thing that helps his pain. pt  taking tylenol and aleve as well with no relief.   Xray in ED consistent with possible septic arthritis. pt received IV ceftriaxone / vancomycin. '      #) Possible Right hand septic arthritis  - admit to medicine  - ceftriaxone and Vanco for now  - blood culture , f/u ESR / CRP  - ID and rheum consult  - rheum work up in the past negative    #) Possible seronegative peripheral arthritis  - f/u rheum consult  - pain control for now  - pt will probably benefit from DMARDS    #)Diet Regular    #) DVT PPX with lovenox    Dispo Acute

## 2020-09-12 NOTE — ED PROVIDER NOTE - NSFOLLOWUPINSTRUCTIONS_ED_ALL_ED_FT
Please follow up with your primary care physician within 24-72 hours and return immediately if symptoms worsen.        Arthralgia    WHAT YOU NEED TO KNOW:    Arthralgia is pain in one or more joints, with no inflammation. It may be short-term and get better within 6 to 8 weeks. Arthralgia can be an early sign of arthritis. Arthralgia may be caused by a medical condition, such as a hormone disorder or a tumor. It may also be caused by an infection or injury.     DISCHARGE INSTRUCTIONS:    Medicines: The following medicines may be ordered for you:   •Acetaminophen decreases pain. Ask how much to take and how often to take it. Follow directions. Acetaminophen can cause liver damage if not taken correctly.      •NSAIDs decrease pain and prevent swelling. Ask your healthcare provider which medicine is right for you. Ask how much to take and when to take it. Take as directed. NSAIDs can cause stomach bleeding and kidney problems if not taken correctly.       •Pain relief cream decreases pain. Use this cream as directed.      •Take your medicine as directed. Contact your healthcare provider if you think your medicine is not helping or if you have side effects. Tell him of her if you are allergic to any medicine. Keep a list of the medicines, vitamins, and herbs you take. Include the amounts, and when and why you take them. Bring the list or the pill bottles to follow-up visits. Carry your medicine list with you in case of an emergency.      Follow up with your healthcare provider or specialist as directed: Write down your questions so you remember to ask them during your visits.     Self-care:   •Apply heat to help decrease pain. Use a heating pad or heat wrap. Apply heat for 20 to 30 minutes every 2 hours for as many days as directed.       •Rest as much as possible. Avoid activities that cause joint pain.       •Apply ice to help decrease swelling and pain. Ice may also help prevent tissue damage. Use an ice pack, or put crushed ice in a plastic bag. Cover it with a towel and place it on your painful joint for 15 to 20 minutes every hour or as directed.       •Support the joint with a brace or elastic wrap as directed.       •Elevate your joint above the level of your heart as often as you can to help decrease swelling and pain. Prop your painful joint on pillows or blankets to keep it elevated comfortably.       •Lose weight if you are overweight. Extra weight can put pressure on your joints and cause more pain. Ask your healthcare provider how much you should weigh. Ask him to help you create a weight loss plan.       •Exercise regularly to help improve joint movement and to decrease pain. Ask about the best exercise plan for you. Low-impact exercises can help take the pressure off your joints. Examples are walking, swimming, and water aerobics.       Physical therapy: A physical therapist teaches you exercises to help improve movement and strength, and to decrease pain. Ask your healthcare provider if physical therapy is right for you.    Contact your healthcare provider or specialist if:   •You have a fever.       •You continue to have joint pain that cannot be relieved with heat, ice, or medicine.      •You have pain and inflammation around your joint.      •You have questions or concerns about your condition or care.      Return to the emergency department if:   •You have sudden, severe pain when you move your joint.       •You have a fever and shaking chills.      •You cannot move your joint.      •You lose feeling on the side of your body where you have the painful joint.          © Copyright [employer] 2020

## 2020-09-12 NOTE — ED PROVIDER NOTE - OBJECTIVE STATEMENT
35 yo male with no known pmh presents w/ pain/edema to R hand/wrist/elbow. pt was recently admitted for polyarticular arthralgias and d/c'ed on prednisone w/ rheumotology f/u on 9/25 but states pain/edema has been worsening since he finished his prednisone.  denies any traumas/falls. denies any other symptoms including fevers, chill, headache, recent illness/travel, cough, abdominal pain, chest pain, or SOB.

## 2020-09-12 NOTE — ED PROVIDER NOTE - ATTENDING CONTRIBUTION TO CARE
34M no pmh, recent admission for polyarticular arthralgias dc home on pred, has appt w rheum on 9/25, p/w worsening pain and swelling to RUE, especially R 3rd PIP which radiates to entire hand. pain is achy constant. no trauma. no numbness, weakness. no fever, chills. pt states ran out of prednisone yesterday and that's they only thing that helps his pain. taking tylenol and aleve as well with no relief.     on exam, AFVSS, well mac nad, ncat, eomi, perrla, mmm, lctab, rrr nl s1s2 no mrg, abd soft ntnd, aaox3, no focal deficits, no le edema or calf ttp, R 3rd PIP edema, no erythema or warmth, unable to range due to pain, otherwise rest of RUE has FROM, no edema, 5/5 motor, silt, 2+radial pulse     a/p;  R 3rd PIP swelling, possible underlying rheum, will do XR r/o occult fx, toradol, refill prednisone re-eval

## 2020-09-12 NOTE — ED PROVIDER NOTE - PROGRESS NOTE DETAILS
spoke with attending radiologist, XR concerning for worsening erosion at R 3rd PIP concerning for septic arthritis, recommends admission for MRI and hand consult.

## 2020-09-12 NOTE — ED PROVIDER NOTE - CLINICAL SUMMARY MEDICAL DECISION MAKING FREE TEXT BOX
pt admitted for septic arthritis r/o, iv abx given, cultures sent, hand consult as inpt (multiple calls to gen surg no response due to multiple code trauma's)

## 2020-09-12 NOTE — H&P ADULT - NSHPPHYSICALEXAM_GEN_ALL_CORE
PHYSICAL EXAM:  GENERAL: NAD, well-developed  HEAD:  Atraumatic, Normocephalic  EYES: EOMI, PERRLA, conjunctiva and sclera clear  NECK: Supple, No JVD  CHEST/LUNG: Clear to auscultation bilaterally; No wheeze  HEART: Regular rate and rhythm; No murmurs, rubs, or gallops  ABDOMEN: Soft, Nontender, Nondistended; Bowel sounds present  EXTREMITIES:  Rt hand swelling  PSYCH: AAOx3

## 2020-09-12 NOTE — ED PROVIDER NOTE - CARE PROVIDER_API CALL
Jace Kaye (DO)  Internal Medicine  [address] [place] [address], [address]  [place] [address]  Phone: [phone]  Fax: [phone]  Follow Up Time: 1-3 Days

## 2020-09-12 NOTE — H&P ADULT - HISTORY OF PRESENT ILLNESS
35 yo male w/ PMHx of persistent joint pain presents c/o bilateral knee, ankle and elbow pain. Patient has been having off and on joint pain for just over one year. Pain has mostly been in both knees and both ankles. He would have morning stiffness that lasted one to one and half hours and then would get better after being active. Throughout the day the stiffness would be better then in the evening he would have throbbing pain in both knees and ankles. He was given a course of steroids earlier this year in February/March which gave him some relief however he has been off steroids since end of March. Dose was 20mg. He was referred to rheumatology (Dr. Duarte) where extensive auto-immune workup was done. Ultimately so far the workup has been consistent with a peripheral spondyloarthropathy. Now pt presenting to ED with Right 3rd  PIP swelling along with pain in entire hand. Pain  is achy constant,  no trauma, numbness, weakness. no , fever, chills. Pt  states ran out of prednisone yesterday and that's they only thing that helps his pain. pt  taking tylenol and aleve as well with no relief.   Xray in ED consistent with possible septic arthritis. pt received IV ceftriaxone / vancomycin.

## 2020-09-13 ENCOUNTER — TRANSCRIPTION ENCOUNTER (OUTPATIENT)
Age: 35
End: 2020-09-13

## 2020-09-13 VITALS
SYSTOLIC BLOOD PRESSURE: 126 MMHG | TEMPERATURE: 97 F | HEART RATE: 67 BPM | OXYGEN SATURATION: 100 % | DIASTOLIC BLOOD PRESSURE: 84 MMHG | RESPIRATION RATE: 18 BRPM

## 2020-09-13 LAB
ANION GAP SERPL CALC-SCNC: 9 MMOL/L — SIGNIFICANT CHANGE UP (ref 7–14)
BUN SERPL-MCNC: 10 MG/DL — SIGNIFICANT CHANGE UP (ref 10–20)
CALCIUM SERPL-MCNC: 8.9 MG/DL — SIGNIFICANT CHANGE UP (ref 8.5–10.1)
CHLORIDE SERPL-SCNC: 103 MMOL/L — SIGNIFICANT CHANGE UP (ref 98–110)
CO2 SERPL-SCNC: 25 MMOL/L — SIGNIFICANT CHANGE UP (ref 17–32)
CREAT SERPL-MCNC: 0.8 MG/DL — SIGNIFICANT CHANGE UP (ref 0.7–1.5)
CRP SERPL-MCNC: <0.1 MG/DL — SIGNIFICANT CHANGE UP (ref 0–0.4)
GLUCOSE SERPL-MCNC: 84 MG/DL — SIGNIFICANT CHANGE UP (ref 70–99)
HCT VFR BLD CALC: 42.2 % — SIGNIFICANT CHANGE UP (ref 42–52)
HGB BLD-MCNC: 13.8 G/DL — LOW (ref 14–18)
MCHC RBC-ENTMCNC: 31 PG — SIGNIFICANT CHANGE UP (ref 27–31)
MCHC RBC-ENTMCNC: 32.7 G/DL — SIGNIFICANT CHANGE UP (ref 32–37)
MCV RBC AUTO: 94.8 FL — HIGH (ref 80–94)
NRBC # BLD: 0 /100 WBCS — SIGNIFICANT CHANGE UP (ref 0–0)
PLATELET # BLD AUTO: 201 K/UL — SIGNIFICANT CHANGE UP (ref 130–400)
POTASSIUM SERPL-MCNC: 4.2 MMOL/L — SIGNIFICANT CHANGE UP (ref 3.5–5)
POTASSIUM SERPL-SCNC: 4.2 MMOL/L — SIGNIFICANT CHANGE UP (ref 3.5–5)
RBC # BLD: 4.45 M/UL — LOW (ref 4.7–6.1)
RBC # FLD: 14.1 % — SIGNIFICANT CHANGE UP (ref 11.5–14.5)
SARS-COV-2 RNA SPEC QL NAA+PROBE: SIGNIFICANT CHANGE UP
SODIUM SERPL-SCNC: 137 MMOL/L — SIGNIFICANT CHANGE UP (ref 135–146)
WBC # BLD: 9.56 K/UL — SIGNIFICANT CHANGE UP (ref 4.8–10.8)
WBC # FLD AUTO: 9.56 K/UL — SIGNIFICANT CHANGE UP (ref 4.8–10.8)

## 2020-09-13 PROCEDURE — 99238 HOSP IP/OBS DSCHRG MGMT 30/<: CPT

## 2020-09-13 RX ORDER — CHLORHEXIDINE GLUCONATE 213 G/1000ML
1 SOLUTION TOPICAL DAILY
Refills: 0 | Status: DISCONTINUED | OUTPATIENT
Start: 2020-09-13 | End: 2020-09-13

## 2020-09-13 RX ADMIN — Medication 166.67 MILLIGRAM(S): at 05:16

## 2020-09-13 RX ADMIN — Medication 20 MILLIGRAM(S): at 15:44

## 2020-09-13 RX ADMIN — Medication 20 MILLIGRAM(S): at 05:16

## 2020-09-13 RX ADMIN — Medication 1 TABLET(S): at 12:38

## 2020-09-13 RX ADMIN — CHLORHEXIDINE GLUCONATE 1 APPLICATION(S): 213 SOLUTION TOPICAL at 12:41

## 2020-09-13 RX ADMIN — CEFTRIAXONE 100 MILLIGRAM(S): 500 INJECTION, POWDER, FOR SOLUTION INTRAMUSCULAR; INTRAVENOUS at 05:17

## 2020-09-13 NOTE — DISCHARGE NOTE PROVIDER - NSDCCPCAREPLAN_GEN_ALL_CORE_FT
PRINCIPAL DISCHARGE DIAGNOSIS  Diagnosis: Swollen finger  Assessment and Plan of Treatment: You had a swollen PIP joint in the middle finger on your right hand. An X-ray was concerning for infection so we gave you antibiotics. But afterwards it seemed unlikely to be an infection and more likely to be a rheumatological problem. Please follow up with a rheumatologist. Please also follow up with your primary doctor.

## 2020-09-13 NOTE — DISCHARGE NOTE PROVIDER - CARE PROVIDER_API CALL
Audrey Duarte (DO)  Internal Medicine  [address] [place] [address], [address]  [place] [address]  Phone: [phone]  Fax: [phone]  Follow Up Time: 2 weeks

## 2020-09-13 NOTE — CONSULT NOTE ADULT - SUBJECTIVE AND OBJECTIVE BOX
LIZETT ROGER  34y, Male  Allergy: Motrin (Unknown)  naproxen (Anaphylaxis)      CHIEF COMPLAINT: Right Hand swelling (12 Sep 2020 20:42)      LOS  1d    HPI:  33 yo male w/ PMHx of persistent joint pain presents c/o bilateral knee, ankle and elbow pain. Patient has been having off and on joint pain for just over one year. Pain has mostly been in both knees and both ankles. He would have morning stiffness that lasted one to one and half hours and then would get better after being active. Throughout the day the stiffness would be better then in the evening he would have throbbing pain in both knees and ankles. He was given a course of steroids earlier this year in February/March which gave him some relief however he has been off steroids since end of March. Dose was 20mg. He was referred to rheumatology (Dr. Duarte) where extensive auto-immune workup was done. Ultimately so far the workup has been consistent with a peripheral spondyloarthropathy. Now pt presenting to ED with Right 3rd  PIP swelling along with pain in entire hand. Pain  is achy constant,  no trauma, numbness, weakness. no , fever, chills. Pt  states ran out of prednisone yesterday and that's they only thing that helps his pain. pt  taking tylenol and aleve as well with no relief.   Xray in ED consistent with possible septic arthritis. pt received IV ceftriaxone / vancomycin.  (12 Sep 2020 20:42)      INFECTIOUS DISEASE HISTORY:    Previous workup: MEENA NEGATIVE , dsDNA NEGATIVE , Lyme NEGATIVE , G/C NEGATIVE 2018, Hep C Ab weakly reactive     PAST MEDICAL & SURGICAL HISTORY:  No pertinent past medical history    No significant past surgical history        FAMILY HISTORY  Family history unknown        SOCIAL HISTORY  Social History:  Lives at home (12 Sep 2020 20:42)        ROS  ***    VITALS:  T(F): 97.6, Max: 98 (09-12-20 @ 13:05)  HR: 53  BP: 113/73  RR: 18Vital Signs Last 24 Hrs  T(C): 36.4 (13 Sep 2020 05:14), Max: 36.7 (12 Sep 2020 13:05)  T(F): 97.6 (13 Sep 2020 05:14), Max: 98 (12 Sep 2020 13:05)  HR: 53 (13 Sep 2020 05:14) (53 - 84)  BP: 113/73 (13 Sep 2020 05:14) (113/73 - 154/70)  BP(mean): --  RR: 18 (12 Sep 2020 23:12) (18 - 19)  SpO2: 95% (13 Sep 2020 07:36) (95% - 100%)    PHYSICAL EXAM:  ***    TESTS & MEASUREMENTS:                        15.0   10.75 )-----------( 212      ( 12 Sep 2020 16:05 )             45.6     09-12    135  |  98  |  10  ----------------------------<  103<H>  4.2   |  25  |  0.8    Ca    9.7      12 Sep 2020 16:05    TPro  7.7  /  Alb  4.3  /  TBili  0.4  /  DBili  x   /  AST  14  /  ALT  7   /  AlkPhos  53  09-12    eGFR if Non African American: 117 mL/min/1.73M2 (09-12-20 @ 16:05)  eGFR if : 136 mL/min/1.73M2 (09-12-20 @ 16:05)    LIVER FUNCTIONS - ( 12 Sep 2020 16:05 )  Alb: 4.3 g/dL / Pro: 7.7 g/dL / ALK PHOS: 53 U/L / ALT: 7 U/L / AST: 14 U/L / GGT: x               Culture - Urine (collected 08-12-20 @ 13:42)  Source: .Urine Clean Catch (Midstream)  Final Report (08-13-20 @ 16:17):    <10,000 CFU/mL Normal Urogenital Kamla        Lactate, Blood: 1.1 mmol/L (09-12-20 @ 16:05)      INFECTIOUS DISEASES TESTING  COVID-19 PCR: NotDetec (08-12-20 @ 16:30)      RADIOLOGY & ADDITIONAL TESTS:  I have personally reviewed the last Chest xray  CXR      CT      CARDIOLOGY TESTING      MEDICATIONS  cefTRIAXone   IVPB 1000 IV Intermittent every 24 hours  chlorhexidine 4% Liquid 1 Topical daily  enoxaparin Injectable 40 SubCutaneous daily  multivitamin 1 Oral daily  predniSONE   Tablet 20 Oral daily  vancomycin  IVPB 1250 IV Intermittent every 12 hours      Weight  Weight (kg): 77.1 (09-12-20 @ 13:05)    ANTIBIOTICS:  cefTRIAXone   IVPB 1000 milliGRAM(s) IV Intermittent every 24 hours  vancomycin  IVPB 1250 milliGRAM(s) IV Intermittent every 12 hours      ALLERGIES:  Motrin (Unknown)  naproxen (Anaphylaxis)         KANA LIZETT  34y, Male  Allergy: Motrin (Unknown)  naproxen (Anaphylaxis)      CHIEF COMPLAINT: Right Hand swelling (12 Sep 2020 20:42)      LOS  1d    HPI:  33 yo male w/ PMHx of persistent joint pain presents c/o bilateral knee, ankle and elbow pain. Patient has been having off and on joint pain for just over one year. Pain has mostly been in both knees and both ankles. He would have morning stiffness that lasted one to one and half hours and then would get better after being active. Throughout the day the stiffness would be better then in the evening he would have throbbing pain in both knees and ankles. He was given a course of steroids earlier this year in February/March which gave him some relief however he has been off steroids since end of March. Dose was 20mg. He was referred to rheumatology (Dr. Duarte) where extensive auto-immune workup was done. Ultimately so far the workup has been consistent with a peripheral spondyloarthropathy. Now pt presenting to ED with Right 3rd  PIP swelling along with pain in entire hand. Pain  is achy constant,  no trauma, numbness, weakness. no , fever, chills. Pt  states ran out of prednisone yesterday and that's they only thing that helps his pain. pt  taking tylenol and aleve as well with no relief.   Xray in ED consistent with possible septic arthritis. pt received IV ceftriaxone / vancomycin.  (12 Sep 2020 20:42)      INFECTIOUS DISEASE HISTORY:  Denies f/c,  reports now decreased edema in the finger  No rash, no diarrhea, no dysuria  Previous workup: MEENA NEGATIVE , dsDNA NEGATIVE , Lyme NEGATIVE , G/C NEGATIVE 2018, Hep C Ab weakly reactive   Denies hx IVDU, + marijuana, +social ETOH, a few shots a week  No travel outside [place] or the [place]  Sexually active with female partner    PAST MEDICAL & SURGICAL HISTORY:  No pertinent past medical history    No significant past surgical history        FAMILY HISTORY  Family history unknown        SOCIAL HISTORY  Social History:  Lives at home (12 Sep 2020 20:42)        ROS  General: Denies rigors, nightsweats  HEENT: Denies headache, rhinorrhea, sore throat, eye pain  CV: Denies CP, palpitations  PULM: Denies wheezing, hemoptysis  GI: Denies hematemesis, hematochezia, melena  : Denies discharge, hematuria  MSK: as noted above   SKIN: Denies rash, lesions  NEURO: Denies paresthesias, weakness  PSYCH: Denies depression, anxiety     VITALS:  T(F): 97.6, Max: 98 (09-12-20 @ 13:05)  HR: 53  BP: 113/73  RR: 18Vital Signs Last 24 Hrs  T(C): 36.4 (13 Sep 2020 05:14), Max: 36.7 (12 Sep 2020 13:05)  T(F): 97.6 (13 Sep 2020 05:14), Max: 98 (12 Sep 2020 13:05)  HR: 53 (13 Sep 2020 05:14) (53 - 84)  BP: 113/73 (13 Sep 2020 05:14) (113/73 - 154/70)  BP(mean): --  RR: 18 (12 Sep 2020 23:12) (18 - 19)  SpO2: 95% (13 Sep 2020 07:36) (95% - 100%)    PHYSICAL EXAM:  Gen: NAD, resting in bed  HEENT: Normocephalic, atraumatic  Neck: supple, no lymphadenopathy  CV: Regular rate & regular rhythm  Lungs: decreased BS at bases, no fremitus  Abdomen: Soft, BS present  Ext: Warm, well perfused, R middle finger edema, no warmth, no erythema, decreased ROM  Neuro: non focal, awake  Skin: no rash, no erythema  Lines: no phlebitis     TESTS & MEASUREMENTS:                        15.0   10.75 )-----------( 212      ( 12 Sep 2020 16:05 )             45.6     09-12    135  |  98  |  10  ----------------------------<  103<H>  4.2   |  25  |  0.8    Ca    9.7      12 Sep 2020 16:05    TPro  7.7  /  Alb  4.3  /  TBili  0.4  /  DBili  x   /  AST  14  /  ALT  7   /  AlkPhos  53  09-12    eGFR if Non African American: 117 mL/min/1.73M2 (09-12-20 @ 16:05)  eGFR if : 136 mL/min/1.73M2 (09-12-20 @ 16:05)    LIVER FUNCTIONS - ( 12 Sep 2020 16:05 )  Alb: 4.3 g/dL / Pro: 7.7 g/dL / ALK PHOS: 53 U/L / ALT: 7 U/L / AST: 14 U/L / GGT: x               Culture - Urine (collected 08-12-20 @ 13:42)  Source: .Urine Clean Catch (Midstream)  Final Report (08-13-20 @ 16:17):    <10,000 CFU/mL Normal Urogenital Kamla        Lactate, Blood: 1.1 mmol/L (09-12-20 @ 16:05)      INFECTIOUS DISEASES TESTING  COVID-19 PCR: NotDetec (08-12-20 @ 16:30)      RADIOLOGY & ADDITIONAL TESTS:  I have personally reviewed the last Chest xray  CXR      CT      CARDIOLOGY TESTING      MEDICATIONS  cefTRIAXone   IVPB 1000 IV Intermittent every 24 hours  chlorhexidine 4% Liquid 1 Topical daily  enoxaparin Injectable 40 SubCutaneous daily  multivitamin 1 Oral daily  predniSONE   Tablet 20 Oral daily  vancomycin  IVPB 1250 IV Intermittent every 12 hours      Weight  Weight (kg): 77.1 (09-12-20 @ 13:05)    ANTIBIOTICS:  cefTRIAXone   IVPB 1000 milliGRAM(s) IV Intermittent every 24 hours  vancomycin  IVPB 1250 milliGRAM(s) IV Intermittent every 12 hours      ALLERGIES:  Motrin (Unknown)  naproxen (Anaphylaxis)

## 2020-09-13 NOTE — PROGRESS NOTE ADULT - SUBJECTIVE AND OBJECTIVE BOX
SUBJECTIVE:    Patient is a 34y old Male who presents with a chief complaint of Right Hand swelling (13 Sep 2020 07:57). Continues to complain of pain in the elbow. Pain in right middle finger only present with flexion.      HPI:  33 yo male w/ PMHx of persistent joint pain presents c/o bilateral knee, ankle and elbow pain. Patient has been having off and on joint pain for just over one year. Pain has mostly been in both knees and both ankles. He would have morning stiffness that lasted one to one and half hours and then would get better after being active. Throughout the day the stiffness would be better then in the evening he would have throbbing pain in both knees and ankles. He was given a course of steroids earlier this year in February/March which gave him some relief however he has been off steroids since end of March. Dose was 20mg. He was referred to rheumatology (Dr. Duarte) where extensive auto-immune workup was done. Ultimately so far the workup has been consistent with a peripheral spondyloarthropathy. Now pt presenting to ED with Right 3rd  PIP swelling along with pain in entire hand. Pain  is achy constant,  no trauma, numbness, weakness. no , fever, chills. Pt  states ran out of prednisone yesterday and that's they only thing that helps his pain. pt  taking tylenol and aleve as well with no relief.   Xray in ED consistent with possible septic arthritis. pt received IV ceftriaxone / vancomycin.  (12 Sep 2020 20:42)      Currently admitted to medicine with the primary diagnosis of Septic arthritis         Besides the pertinent positives and negatives described above, the ROS was within normal limits.    PAST MEDICAL & SURGICAL HISTORY  No pertinent past medical history    No significant past surgical history      SOCIAL HISTORY:    ALLERGIES:  Motrin (Unknown)  naproxen (Anaphylaxis)    MEDICATIONS:  STANDING MEDICATIONS  cefTRIAXone   IVPB 1000 milliGRAM(s) IV Intermittent every 24 hours  chlorhexidine 4% Liquid 1 Application(s) Topical daily  enoxaparin Injectable 40 milliGRAM(s) SubCutaneous daily  multivitamin 1 Tablet(s) Oral daily  predniSONE   Tablet 20 milliGRAM(s) Oral daily  vancomycin  IVPB 1250 milliGRAM(s) IV Intermittent every 12 hours    PRN MEDICATIONS  oxycodone    5 mG/acetaminophen 325 mG 1 Tablet(s) Oral every 6 hours PRN    VITALS:   Vital Signs Last 24 Hrs  T(C): 36.4 (13 Sep 2020 05:14), Max: 36.7 (12 Sep 2020 13:05)  T(F): 97.6 (13 Sep 2020 05:14), Max: 98 (12 Sep 2020 13:05)  HR: 53 (13 Sep 2020 05:14) (53 - 84)  BP: 113/73 (13 Sep 2020 05:14) (113/73 - 154/70)  BP(mean): --  RR: 18 (12 Sep 2020 23:12) (18 - 19)  SpO2: 95% (13 Sep 2020 07:36) (95% - 100%)    LABS:                        13.8   9.56  )-----------( 201      ( 13 Sep 2020 07:38 )             42.2     09-12    135  |  98  |  10  ----------------------------<  103<H>  4.2   |  25  |  0.8    Ca    9.7      12 Sep 2020 16:05    TPro  7.7  /  Alb  4.3  /  TBili  0.4  /  DBili  x   /  AST  14  /  ALT  7   /  AlkPhos  53  09-12          Lactate, Blood: 1.1 mmol/L (09-12-20 @ 16:05)  Sedimentation Rate, Erythrocyte: 11 mm/Hr <H> (09-12-20 @ 16:04)          RADIOLOGY:    < from: Xray Wrist 2 Views, Right (09.12.20 @ 14:58) >  FINDINGS: No acutely displaced fracture. Normal alignment. Minimal radiocarpal and ulnocarpal degenerative change. Wrist swelling.    < end of copied text >    < from: Xray Elbow AP + Lateral + Oblique, Right (09.12.20 @ 14:54) >  FINDINGS: No acutely displaced fracture or joint malalignment. No posterior fat pad elevation. Mild radial head degenerative change Mild triceps insertional thickening unchanged.    < end of copied text >    < from: Xray Hand 3 Views, Right (09.12.20 @ 14:53) >  IMPRESSION: Since 8/12/2020, worsening soft tissue swelling, periostitis and cortical erosion at radial base of middle phalanx. Findings suggest septic arthritis. Consider MRI third finger with and without contrast    < end of copied text >      PHYSICAL EXAM:  CONSTITUTIONAL: In no acute distress  HEAD: Normocephalic; atraumatic.  EYES: Extraocular movements intact; conjunctiva and sclera clear.  ENT: Moist mucus membranes.  NECK: Supple; non tender. No rigidity  CARD: Regular rate and rhythm. Normal S1, S2  RESP: Lungs clear to auscultation bilaterally.   ABD: Abdomen soft; non-tender; non-distended; no hepatosplenomegaly.  EXT: Right third finger swollen, nonerythematous, nontender, limited flexion. Limited flexion R elbow. Otherwise full ROM  NEUROLOGY: Non-focal.  PSYCH: Cooperative, appropriate.

## 2020-09-13 NOTE — DISCHARGE NOTE PROVIDER - NSDCMRMEDTOKEN_GEN_ALL_CORE_FT
Aleve 220 mg oral tablet:   folic acid 1 mg oral tablet: 1 tab(s) orally once a day  Multiple Vitamins oral tablet: 1 tab(s) orally once a day  predniSONE 20 mg oral tablet: 1 tab(s) orally once a day  thiamine 100 mg oral tablet: 1 tab(s) orally once a day

## 2020-09-13 NOTE — DISCHARGE NOTE PROVIDER - NSDCFUADDAPPT_GEN_ALL_CORE_FT
Please schedule an appointment with the rheumatologist in 2 weeks. Please also follow up with your primary doctor.

## 2020-09-13 NOTE — CONSULT NOTE ADULT - ASSESSMENT
ASSESSMENT  35 yo male w/ PMHx of persistent joint pain presents c/o bilateral knee, ankle and elbow pain. Patient has been having off and on joint pain for just over one year. Seen by Rheum where extensive auto-immune workup was done, so far consistent with a peripheral spondyloarthropathy. Now pt presenting to ED with Right 3rd  PIP swelling along with pain in entire hand.    IMPRESSION  #    Afebrile  < from: Xray Hand 3 Views, Right (09.12.20 @ 14:53) >  Since 8/12/2020, worsening soft tissue swelling, periostitis and cortical erosion at radial base of middle phalanx. Findings suggest septic arthritis.  Previous workup: MEENA NEGATIVE , dsDNA NEGATIVE , Lyme NEGATIVE , G/C NEGATIVE 2018, Hep C Ab weakly reactive       RECOMMENDATIONS  This is an incomplete consult note. All recommendations to follow after interview and examination of the patient.   - Check HIV Ab/Ag, Check HCV Ab (previously weakly +)  - Send G/C urine  - Rheum consult    If any questions, please call or send a message on [employer] Teams  Spectra [phone]   ASSESSMENT  33 yo male w/ PMHx of persistent joint pain presents c/o bilateral knee, ankle and elbow pain. Patient has been having off and on joint pain for just over one year. Seen by Rheum where extensive auto-immune workup was done, so far consistent with a peripheral spondyloarthropathy. Now pt presenting to ED with Right 3rd  PIP swelling along with pain in entire hand.    IMPRESSION  #Non-infectious arthritis    Afebrile  < from: Xray Hand 3 Views, Right (09.12.20 @ 14:53) >  Since 8/12/2020, worsening soft tissue swelling, periostitis and cortical erosion at radial base of middle phalanx. Findings suggest septic arthritis.  Previous workup: MEENA NEGATIVE , dsDNA NEGATIVE , Lyme NEGATIVE , G/C NEGATIVE 2018, Hep C Ab weakly reactive       RECOMMENDATIONS  - D/C abx and monitor  - Check HIV Ab/Ag, Check HCV Ab (previously weakly +)  - Send G/C urine  - Rheum consult    If any questions, please call or send a message on [employer] Teams  Spectra [phone]

## 2020-09-13 NOTE — DISCHARGE NOTE NURSING/CASE MANAGEMENT/SOCIAL WORK - PATIENT PORTAL LINK FT
You can access the FollowMyHealth Patient Portal offered by [facility] by registering at the following website: [email or web address]. By joining [employer]’s FollowMyHealth portal, you will also be able to view your health information using other applications (apps) compatible with our system.

## 2020-09-13 NOTE — PROGRESS NOTE ADULT - ASSESSMENT
35 yo male w/ PMHx of persistent joint pain presents c/o bilateral knee, ankle and elbow pain. Patient has been having off and on joint pain for just over one year. Pain has mostly been in both knees and both ankles. He would have morning stiffness that lasted one to one and half hours and then would get better after being active. Throughout the day the stiffness would be better then in the evening he would have throbbing pain in both knees and ankles. He was given a course of steroids earlier this year in February/March which gave him some relief however he has been off steroids since end of March. Dose was 20mg. He was referred to rheumatology (Dr. Duarte) where extensive auto-immune workup was done. Ultimately so far the workup has been consistent with a peripheral spondyloarthropathy. Now pt presenting to ED with Right 3rd  PIP swelling along with pain in entire hand. Pain  is achy constant,  no trauma, numbness, weakness. no , fever, chills. Pt  states ran out of prednisone yesterday and that's they only thing that helps his pain. pt  taking tylenol and aleve as well with no relief.   Xray in ED consistent with possible septic arthritis. pt received IV ceftriaxone / vancomycin. '      #) Possible Right hand septic arthritis  - admit to medicine  - ceftriaxone and Vanco for now  - blood culture , f/u ESR / CRP  - ID and rheum consult  - rheum work up in the past negative    #) Possible seronegative peripheral arthritis  - f/u rheum consult  - pain control for now  - pt will probably benefit from DMARDS    #)Diet Regular    #) DVT PPX with lovenox    Dispo Acute 33 yo male w/ PMHx of persistent joint pain who presented with swelling of the right 3rd PIP and worsening pain in several joints.    # Possible Right hand septic arthritis  - Hand X-ray findings in ED showing periostitis and erosions consistent with septic arthritis, however nontender, nonerythematous, not warm, painful only with ROM  - ceftriaxone and Vanco for now  - F/u blood culture  - ESR 11, CRP < 0.10  - F/u ID and rheum consult  - rheum work up in the past negative    # Possible seronegative peripheral arthritis: in the setting of months of migratory polyarthralgia  - f/u rheum consult  - pain control for now  - pt will probably benefit from DMARDS  - Previous rheum w/u negative    # Diet Regular  # DVT PPX with lovenox    Dispo Acute 35 yo male w/ PMHx of persistent joint pain who presented with swelling of the right 3rd PIP and worsening pain in several joints.    # Possible Right hand septic arthritis  - Hand X-ray findings in ED showing periostitis and erosions consistent with septic arthritis, however nontender, nonerythematous, not warm, painful only with ROM  - ceftriaxone and Vanco for now  - F/u blood culture  - ESR 11, CRP < 0.10  - F/u ID and rheum consult  - F/u urine G/C  - rheum work up in the past negative  - Consider arthrocentesis    # Possible seronegative peripheral arthritis: in the setting of months of migratory polyarthralgia  - f/u rheum consult  - pain control for now  - pt will probably benefit from DMARDS  - Previous rheum w/u negative    # Diet Regular  # DVT PPX with lovenox    Dispo Acute

## 2020-09-13 NOTE — DISCHARGE NOTE PROVIDER - HOSPITAL COURSE
35 yo male w/ PMHx of persistent joint pain presents c/o bilateral knee, ankle and elbow pain. Patient has been having off and on joint pain for just over one year. Pain has mostly been in both knees and both ankles. He would have morning stiffness that lasted one to one and half hours and then would get better after being active. Throughout the day the stiffness would be better then in the evening he would have throbbing pain in both knees and ankles. He was given a course of steroids earlier this year in February/March which gave him some relief however he has been off steroids since end of March. Dose was 20mg. He was referred to rheumatology (Dr. Duarte) where extensive auto-immune workup was done. Ultimately so far the workup has been consistent with a peripheral spondyloarthropathy. Now pt presenting to ED with Right 3rd  PIP swelling along with pain in entire hand. Pain  is achy constant,  no trauma, numbness, weakness. no , fever, chills. Pt  states ran out of prednisone yesterday and that's they only thing that helps his pain. pt  taking tylenol and aleve as well with no relief.   Xray in ED consistent with possible septic arthritis. pt received IV ceftriaxone / vancomycin.     Patient was afebrile with normal WBC count and no evidence of inflammation at the PIP joint in question. The overall picture seemed inconsistent with infection. Per ID antibiotics were stopped. Patient was agreeable to go home with a prescription for prednisone and follow up with a rheumatologist.

## 2020-09-17 DIAGNOSIS — R60.9 EDEMA, UNSPECIFIED: ICD-10-CM

## 2020-09-17 DIAGNOSIS — M19.041 PRIMARY OSTEOARTHRITIS, RIGHT HAND: ICD-10-CM

## 2020-09-17 DIAGNOSIS — Z88.8 ALLERGY STATUS TO OTHER DRUGS, MEDICAMENTS AND BIOLOGICAL SUBSTANCES: ICD-10-CM

## 2020-09-17 DIAGNOSIS — M79.641 PAIN IN RIGHT HAND: ICD-10-CM

## 2020-09-18 DIAGNOSIS — Z71.89 OTHER SPECIFIED COUNSELING: ICD-10-CM

## 2020-09-18 LAB
CULTURE RESULTS: SIGNIFICANT CHANGE UP
CULTURE RESULTS: SIGNIFICANT CHANGE UP
SPECIMEN SOURCE: SIGNIFICANT CHANGE UP
SPECIMEN SOURCE: SIGNIFICANT CHANGE UP

## 2020-09-25 ENCOUNTER — APPOINTMENT (OUTPATIENT)
Dept: NEUROLOGY | Facility: CLINIC | Age: 35
End: 2020-09-25
Payer: MEDICAID

## 2020-09-25 VITALS
WEIGHT: 165 LBS | TEMPERATURE: 98.4 F | HEIGHT: 72 IN | SYSTOLIC BLOOD PRESSURE: 122 MMHG | OXYGEN SATURATION: 98 % | BODY MASS INDEX: 22.35 KG/M2 | DIASTOLIC BLOOD PRESSURE: 81 MMHG | HEART RATE: 66 BPM

## 2020-09-25 PROCEDURE — 99202 OFFICE O/P NEW SF 15 MIN: CPT

## 2020-09-25 NOTE — REVIEW OF SYSTEMS
[Arthralgias] : arthralgias [Joint Pain] : joint pain [Joint Swelling] : joint swelling [Joint Stiffness] : joint stiffness [Limb Swelling] : limb swelling [Negative] : Heme/Lymph

## 2020-09-25 NOTE — PHYSICAL EXAM
[FreeTextEntry1] : Mental status: Awake, alert and oriented x3.  Recent and remote memory intact.  Naming, repetition and comprehension intact.  Attention/concentration intact.  No dysarthria, no aphasia.  Fund of knowledge appropriate.  \par Cranial nerves: Pupils equally round and reactive to light, visual fields full, no nystagmus, extraocular muscles intact, V1 through V3 intact bilaterally and symmetric, face symmetric, hearing intact to finger rub, palate elevation symmetric, tongue was midline.\par Motor:  MRC grading 5/5 b/l UE/LE.   strength 5/5.  Normal tone and bulk.  No abnormal movements.  Reduced ROM in right elbow and right finger flexion. \par Sensation: Intact to light touch, proprioception, and pinprick. \par Coordination: No dysmetria on finger-to-nose and heel-to-shin.  No dysdiadokinesia.\par Reflexes: 2+ in bilateral UE/LE, downgoing toes bilaterally. (-) Barnard.\par Gait: Narrow and steady. No ataxia.  Romberg negative\par \par

## 2020-09-25 NOTE — HISTORY OF PRESENT ILLNESS
[FreeTextEntry1] : LIZETT ROGER is a 34 year old man is being seen in neurology office as a new patient for joint pain. He has history of multiple joint pain, redness and swelling for the past 1-2 years. His recent work up in the hospital was inconclusive. He is currently on prednisone 20 mg/day. He was supposed to follow up with is PCP and Rheumatology after hospital discharge. Currently has joint pain in the right elbow, right hand DIP and PIP joints and reduced range of motion. Denies headache, neck pain, low back pain, weakness or falls.

## 2020-09-25 NOTE — ASSESSMENT
[FreeTextEntry1] : LIZETT ROGER is a 34 year old man recently admitted to hospital for multiple joint stiffness, pain and swelling. Septic arthritis was ruled out. Lab work up were inconclusive. His exam shows no neurological deficit. Recommended to follow up with Rheumatologist (name and address given). \par \par Follow up as needed

## 2020-09-26 ENCOUNTER — EMERGENCY (EMERGENCY)
Facility: HOSPITAL | Age: 35
LOS: 0 days | Discharge: HOME | End: 2020-09-26
Attending: EMERGENCY MEDICINE | Admitting: EMERGENCY MEDICINE
Payer: MEDICAID

## 2020-09-26 VITALS
OXYGEN SATURATION: 97 % | DIASTOLIC BLOOD PRESSURE: 71 MMHG | SYSTOLIC BLOOD PRESSURE: 121 MMHG | HEIGHT: 72 IN | HEART RATE: 89 BPM | RESPIRATION RATE: 18 BRPM | TEMPERATURE: 99 F

## 2020-09-26 DIAGNOSIS — F17.200 NICOTINE DEPENDENCE, UNSPECIFIED, UNCOMPLICATED: ICD-10-CM

## 2020-09-26 DIAGNOSIS — Z88.8 ALLERGY STATUS TO OTHER DRUGS, MEDICAMENTS AND BIOLOGICAL SUBSTANCES: ICD-10-CM

## 2020-09-26 DIAGNOSIS — Z76.0 ENCOUNTER FOR ISSUE OF REPEAT PRESCRIPTION: ICD-10-CM

## 2020-09-26 PROCEDURE — 99283 EMERGENCY DEPT VISIT LOW MDM: CPT

## 2020-09-26 RX ADMIN — Medication 20 MILLIGRAM(S): at 17:31

## 2020-09-26 NOTE — ED PROVIDER NOTE - PATIENT PORTAL LINK FT
You can access the FollowMyHealth Patient Portal offered by [facility] by registering at the following website: [email or web address]. By joining [employer]’s FollowMyHealth portal, you will also be able to view your health information using other applications (apps) compatible with our system.

## 2020-09-26 NOTE — ED PROVIDER NOTE - OBJECTIVE STATEMENT
34Y M with PMH of peripheral 34Y M with PMH of likely peripheral spondyloarthropathy presents with CC of medication refill. Patient reports he has chronic joint pain, believed to be rheumatological related, recently admitted for concern for septic arthritis of the right 3rd digit, which was negative for infection and discharged with follow up rheumatology but was given neurologist accidentally, whom he met yesterday and advised him to get a rheumatologist referral. Patient reports he frequently comes to the ER for prednisone refill, has not seen his PMD since April. Denies worsening joint pain, right 3rd digit swelling, fevers, chills, chest pain, SOB, abdominal pain, N/V/D, dysuria/hematuria.

## 2020-09-26 NOTE — ED ADULT NURSE NOTE - NS PRO AD NO ADVANCE DIRECTIVE
The patient has been examined and the H&P has been reviewed:    I concur with the findings and no changes have occurred since H&P was written.    Anesthesia/Surgery risks, benefits and alternative options discussed and understood by patient/family.          Active Hospital Problems    Diagnosis  POA    Lumbar spondylosis [M47.816]  Yes      Resolved Hospital Problems    Diagnosis Date Resolved POA   No resolved problems to display.     
No

## 2020-09-26 NOTE — ED PROVIDER NOTE - CLINICAL SUMMARY MEDICAL DECISION MAKING FREE TEXT BOX
pt with history of peripheral spondyloarthropathy presenting for refill of prednisone. reports improved sx but unable to follow-up with rheumatology, ran out of his prior rx. requesting new referral for rheum. no other acute complaints. exam as above. Comfortable with discharge and follow-up outpatient, strict return precautions given. Endorses understanding of all of this and aware that they can return at any time for new or concerning symptoms. No further questions or concerns at this time

## 2020-09-26 NOTE — ED ADULT NURSE NOTE - NS ED NURSE LEVEL OF CONSCIOUSNESS AFFECT
Chief Complaint   Patient presents with   • Surgical Followup     Resection omental mass with en bloc abdominal wall and jejunal resection       History of Present Illness: Andrew Valenzuela, is a 80 year old male who underwent exploratory laparotomy, lysis of adhesions x1 hour, and excision of  mass in the greater omentum with en bloc resection of a portion of the left upper quadrant abdominal wall and 2 segments of jejunum.  Primary incisional hernia repair.   on March 17, 2020.      Minimal residual incisional pain.   No fevers or chills.  He is eating well.  He is moving his bowels normally.  No nausea, cramping, or bloating    Meds:  Current Outpatient Medications   Medication Sig Dispense Refill   • carvedilol (COREG) 3.125 MG tablet TK 1 T PO BID  2   • enalapril (VASOTEC) 2.5 MG tablet Take 2.5 mg by mouth daily.     • gabapentin (NEURONTIN) 100 MG capsule TK 1 TO 2 CS PO QHS UTD  1   • ACCU-CHEK MACIE PLUS test strip TEST ONCE D  3   • ACCU-CHEK FASTCLIX LANCETS Misc USE TO TEST QD  3   • metFORMIN (GLUCOPHAGE) 500 MG tablet Take 500 mg by mouth daily.     • predniSONE (DELTASONE) 20 MG tablet      • atorvastatin (LIPITOR) 20 MG tablet TK 1 T PO QAM  1     No current facility-administered medications for this visit.        Allergies:  ALLERGIES:  No Known Allergies     OBJECTIVE:  There were no vitals taken for this visit.    Physical Exam:  General: NAD  Skin: Warm and dry.  Abdomen: Soft and scaphoid.  His ileostomy is working well.  The midline incision is clean and dry.  The staples are removed without incident.  The fascial closure is intact.  No evidence of infection.    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  -     Special stains for  mycobacteria (AFB) and fungi (GMS)  are negative.  -     No evidence of malignancy.      ASSESSMENT:  He is doing very well.  Happily his pathology showed the mass, which lit up brightly on PET scan, with fat necrosis.  There was no evidence of any recurrent colon cancer or any other type of malignancy.    PLAN:  He will return if any surgical problems or concerns develop.    Bassem Moran MD   Calm

## 2020-09-26 NOTE — ED PROVIDER NOTE - CARE PROVIDER_API CALL
maverick Shirley  [address] [place] [address]  Phone: [phone]  Fax: (   )    -  Follow Up Time:     Rebeca Castro  [address] [address]  Phone: [phone]  Fax: (   )    -  Follow Up Time:

## 2020-09-26 NOTE — ED PROVIDER NOTE - NSFOLLOWUPINSTRUCTIONS_ED_ALL_ED_FT
Prednisone for four days has been sent to your pharmacy. Please  the prescription and then follow up with a rheumatologist for further management.

## 2020-09-26 NOTE — ED PROVIDER NOTE - PROVIDER TOKENS
FREE:[LAST:[Casper],FIRST:[maverick],PHONE:[phone]],FAX:[(   )    -],ADDRESS:[[address] [place] [address]]],FREE:[LAST:[Matthew],FIRST:[Rebeca],PHONE:[phone]:[(   )    -],ADDRESS:[[address] [place] [address]]]

## 2020-09-26 NOTE — ED PROVIDER NOTE - PHYSICAL EXAMINATION
VITALS: Reviewed  CONSTITUTIONAL: well developed, well nourished, in no acute distress, speaking in full sentences, nontoxic appearing  SKIN: warm, dry, no rash  HEAD: normocephalic, atraumatic  ENT: patent airway, moist mucous membranes  NECK: supple, no masses  CV:  S1, S2  RESP: CTAB  ABD: soft, nontender, nondistended, no rebound, no guarding  MSK: normal ROM, no cyanosis, no edema--limited range of motion in right 3rd digit due to swelling over MIP joint, no erythema, no drainage, no warmth, no infectious signs  NEURO: alert, oriented x3  PSYCH: cooperative, appropriate

## 2020-12-08 ENCOUNTER — APPOINTMENT (OUTPATIENT)
Dept: RHEUMATOLOGY | Facility: CLINIC | Age: 35
End: 2020-12-08
Payer: MEDICAID

## 2020-12-08 VITALS
DIASTOLIC BLOOD PRESSURE: 80 MMHG | SYSTOLIC BLOOD PRESSURE: 120 MMHG | TEMPERATURE: 97.9 F | HEIGHT: 72 IN | WEIGHT: 165 LBS | HEART RATE: 52 BPM | BODY MASS INDEX: 22.35 KG/M2 | OXYGEN SATURATION: 97 %

## 2020-12-08 PROCEDURE — 99072 ADDL SUPL MATRL&STAF TM PHE: CPT

## 2020-12-08 PROCEDURE — 99214 OFFICE O/P EST MOD 30 MIN: CPT

## 2020-12-08 RX ORDER — PREDNISONE 20 MG/1
20 TABLET ORAL
Refills: 0 | Status: DISCONTINUED | COMMUNITY
End: 2020-12-08

## 2020-12-09 NOTE — HISTORY OF PRESENT ILLNESS
[FreeTextEntry1] : 35 year old healthy male here for follow up.\par \par \par Patient reports persistent right 3rd PIP swelling and pain, right wrist pain and swelling, and new rash that just developed. It initially start on his left abdomen/flank and spread throughout his body. He has an appointment to see dermatology upcoming. Reports bilateral elbows lock up, knees are painful without swelling, and prolonged AM stiffness throughout. He is out of prednisone that his PCP prescribed for him and reports worsening pain. \par \par Denies enthesitis, dactylitis, uveitis, psoriasis, recent infectoin, inflammatory bowel disease or low back stiffness.

## 2020-12-09 NOTE — PHYSICAL EXAM
[General Appearance - Alert] : alert [General Appearance - In No Acute Distress] : in no acute distress [Sclera] : the sclera and conjunctiva were normal [PERRL With Normal Accommodation] : pupils were equal in size, round, and reactive to light [Extraocular Movements] : extraocular movements were intact [Outer Ear] : the ears and nose were normal in appearance [Oropharynx] : the oropharynx was normal [Auscultation Breath Sounds / Voice Sounds] : lungs were clear to auscultation bilaterally [Heart Rate And Rhythm] : heart rate was normal and rhythm regular [Heart Sounds] : normal S1 and S2 [Heart Sounds Gallop] : no gallops [Murmurs] : no murmurs [Heart Sounds Pericardial Friction Rub] : no pericardial rub [Bowel Sounds] : normal bowel sounds [Abdomen Soft] : soft [Abdomen Tenderness] : non-tender [] : no hepato-splenomegaly [Abdomen Mass (___ Cm)] : no abdominal mass palpated [Sensation] : the sensory exam was normal to light touch and pinprick [Motor Exam] : the motor exam was normal [Affect] : the affect was normal [Mood] : the mood was normal [FreeTextEntry1] : Cysts on scalp, numerous hyperpigmented macules mostly bilateral flanks and then less seen on bilateral arms, low back. No finger nail abnormalities

## 2020-12-09 NOTE — ASSESSMENT
[FreeTextEntry1] : Pleasant 35 year old male with no past medical history here for follow up.\par \par \par 1. Seronegative, erosive inflammatory arthritis. RA vs psoriatic arthritis\par \par -Negative MEENA, RF, CCP. Normal ESR and CRP in 9/2020 \par -X-ray of right foot, bilateral knees,  bilateral ankles negative in 2019\par -X-ray of bilateral knees and elbows negative. Bilateral hands show diffuse swelling of right third PIP in 8/2020 \par -Admitted 9/2020 for hand pain and right hand x-ray suggested worsening PIP swelling, erosion, and periostitis. MRI to follow up was recommended in report. Right wrist and elbow x-ray unrevealing\par -Erythema bilateral palms including on lateral side with new outbreak of rash ? post inflammatory hyperpigmentation related to underlying psoriasis. Tapering off steroids can cause worsening flare of skin psoriasis similar to patient's current presentation\par -Will start prednisone 20 mg daily for now and he wishes to start Humira 40 mg SQ q14 days after r/b/a discussed in detail. He drinks ETOH on regular basis and will avoid MTX. \par -Check labs and ultrasound bilaterally. May need MRI of right 3rd PIP\par \par \par

## 2020-12-30 ENCOUNTER — LABORATORY RESULT (OUTPATIENT)
Age: 35
End: 2020-12-30

## 2020-12-31 ENCOUNTER — NON-APPOINTMENT (OUTPATIENT)
Age: 35
End: 2020-12-31

## 2020-12-31 LAB
ALBUMIN SERPL ELPH-MCNC: 4.1 G/DL
ALP BLD-CCNC: 44 U/L
ALT SERPL-CCNC: 9 U/L
ANION GAP SERPL CALC-SCNC: 12 MMOL/L
AST SERPL-CCNC: 15 U/L
BASOPHILS # BLD AUTO: 0.04 K/UL
BASOPHILS NFR BLD AUTO: 0.5 %
BILIRUB SERPL-MCNC: 0.3 MG/DL
BUN SERPL-MCNC: 7 MG/DL
CALCIUM SERPL-MCNC: 9.6 MG/DL
CHLORIDE SERPL-SCNC: 104 MMOL/L
CO2 SERPL-SCNC: 25 MMOL/L
CREAT SERPL-MCNC: 0.9 MG/DL
CRP SERPL-MCNC: 0.1 MG/DL
EOSINOPHIL # BLD AUTO: 0.05 K/UL
EOSINOPHIL NFR BLD AUTO: 0.6 %
ERYTHROCYTE [SEDIMENTATION RATE] IN BLOOD BY WESTERGREN METHOD: 13 MM/HR
GLUCOSE SERPL-MCNC: 91 MG/DL
HBV CORE IGG+IGM SER QL: NONREACTIVE
HBV CORE IGM SER QL: NONREACTIVE
HBV SURFACE AB SER QL: REACTIVE
HBV SURFACE AG SER QL: NONREACTIVE
HCT VFR BLD CALC: 43.5 %
HCV AB SER QL: ABNORMAL
HCV S/CO RATIO: 1.32 S/CO
HGB BLD-MCNC: 14.3 G/DL
IMM GRANULOCYTES NFR BLD AUTO: 0.4 %
LYMPHOCYTES # BLD AUTO: 3.85 K/UL
LYMPHOCYTES NFR BLD AUTO: 46.9 %
MAN DIFF?: NORMAL
MCHC RBC-ENTMCNC: 31 PG
MCHC RBC-ENTMCNC: 32.9 G/DL
MCV RBC AUTO: 94.4 FL
MONOCYTES # BLD AUTO: 0.66 K/UL
MONOCYTES NFR BLD AUTO: 8 %
NEUTROPHILS # BLD AUTO: 3.58 K/UL
NEUTROPHILS NFR BLD AUTO: 43.6 %
PLATELET # BLD AUTO: 218 K/UL
POTASSIUM SERPL-SCNC: 4 MMOL/L
PROT SERPL-MCNC: 7.1 G/DL
RBC # BLD: 4.61 M/UL
RBC # FLD: 13.8 %
SODIUM SERPL-SCNC: 141 MMOL/L
WBC # FLD AUTO: 8.21 K/UL

## 2021-01-04 LAB
M TB IFN-G BLD-IMP: NEGATIVE
QUANTIFERON TB PLUS MITOGEN MINUS NIL: >10 IU/ML
QUANTIFERON TB PLUS NIL: 0.12 IU/ML
QUANTIFERON TB PLUS TB1 MINUS NIL: -0.01 IU/ML
QUANTIFERON TB PLUS TB2 MINUS NIL: 0 IU/ML

## 2021-01-05 ENCOUNTER — APPOINTMENT (OUTPATIENT)
Dept: RHEUMATOLOGY | Facility: CLINIC | Age: 36
End: 2021-01-05
Payer: MEDICAID

## 2021-01-05 ENCOUNTER — APPOINTMENT (OUTPATIENT)
Dept: RHEUMATOLOGY | Facility: CLINIC | Age: 36
End: 2021-01-05

## 2021-01-05 PROCEDURE — ZZZZZ: CPT

## 2021-01-05 NOTE — REASON FOR VISIT
[Home] : at home, [unfilled] , at the time of the visit. [Medical Office: ([facility])___] : at the medical office located in

## 2021-01-05 NOTE — HISTORY OF PRESENT ILLNESS
[FreeTextEntry1] : 35 year old healthy male here for follow up.\par \par \par Patient reports persistent right 3rd PIP swelling and pain, right wrist pain and swelling, and new rash that just developed. It initially start on his left abdomen/flank and spread throughout his body. He has an appointment to see dermatology upcoming. Reports bilateral elbows lock up, knees are painful without swelling, and prolonged AM stiffness throughout. He is out of prednisone that his PCP prescribed for him and reports worsening pain. \par \par Denies enthesitis, dactylitis, uveitis, psoriasis, recent infection, inflammatory bowel disease or low back stiffness.\par \par Today's visit:\par He reports 50-80% improvement with prednisone in pain in his hands. It has alleviated the throbbing pain, swelling,  and AM stiffness. His middle, ring, pinky of right hand wont bend, which was present prior to prednisone. He can squat down, which he was unable to prior. His appointment with Derm was during the snow storm and they were closed but he has an appointment with them upcoming. Itchiness resolved, dark spots from scratching, but improved. Elbows have not been locking up, knees are not painful or swollen. He can't run, but he can jog.

## 2021-02-07 ENCOUNTER — EMERGENCY (EMERGENCY)
Facility: HOSPITAL | Age: 36
LOS: 0 days | Discharge: HOME | End: 2021-02-07
Attending: EMERGENCY MEDICINE | Admitting: EMERGENCY MEDICINE
Payer: MEDICAID

## 2021-02-07 VITALS
OXYGEN SATURATION: 99 % | RESPIRATION RATE: 18 BRPM | DIASTOLIC BLOOD PRESSURE: 76 MMHG | HEART RATE: 77 BPM | SYSTOLIC BLOOD PRESSURE: 123 MMHG

## 2021-02-07 VITALS
SYSTOLIC BLOOD PRESSURE: 134 MMHG | OXYGEN SATURATION: 98 % | WEIGHT: 165.35 LBS | DIASTOLIC BLOOD PRESSURE: 80 MMHG | HEART RATE: 76 BPM | RESPIRATION RATE: 18 BRPM | HEIGHT: 72 IN | TEMPERATURE: 98 F

## 2021-02-07 DIAGNOSIS — M06.9 RHEUMATOID ARTHRITIS, UNSPECIFIED: ICD-10-CM

## 2021-02-07 DIAGNOSIS — Z88.8 ALLERGY STATUS TO OTHER DRUGS, MEDICAMENTS AND BIOLOGICAL SUBSTANCES: ICD-10-CM

## 2021-02-07 DIAGNOSIS — Z76.0 ENCOUNTER FOR ISSUE OF REPEAT PRESCRIPTION: ICD-10-CM

## 2021-02-07 DIAGNOSIS — F17.200 NICOTINE DEPENDENCE, UNSPECIFIED, UNCOMPLICATED: ICD-10-CM

## 2021-02-07 DIAGNOSIS — M79.641 PAIN IN RIGHT HAND: ICD-10-CM

## 2021-02-07 DIAGNOSIS — M25.521 PAIN IN RIGHT ELBOW: ICD-10-CM

## 2021-02-07 PROCEDURE — 99283 EMERGENCY DEPT VISIT LOW MDM: CPT

## 2021-02-07 RX ADMIN — Medication 20 MILLIGRAM(S): at 10:54

## 2021-02-07 NOTE — ED PROVIDER NOTE - PROGRESS NOTE DETAILS
ATTENDING NOTE: 34 y/o male with hx of rheumatoid arthritis, on prednisone taper, awaiting transition to Humera. Tapered prednisone down to 15 mg. ow c/o worsening swelling to joints of right hand and right elbow. No fever. No H/A. No vomiting.  O/E: Constitutional: Non-toxic in appearance. Respiratory: Lungs CTA and equal b/l. Cardio: S1 S2 regular, no murmur. ABD: ABD soft, no tenderness. Extremities: Right MCP joint with mild swelling and tenderness. No erythema., No warmth. Right elbow joint mild swelling and tenderness. No erythema, No warmth. Skin: No skin rash. Neuro: No neurologic deficits.   Imp: RA flare. No sign of septic arthritis.  A/P: Will restart prednisone. Patient has f/u with rheumatology Dr. Ventura tomorrow.

## 2021-02-07 NOTE — ED ADULT TRIAGE NOTE - CHIEF COMPLAINT QUOTE
pt ambulated to triage with complaint of swelling to his hands , states he ran iut of prednisone for his RA

## 2021-02-07 NOTE — ED PROVIDER NOTE - NS ED ROS FT
Constitutional: (-) fever  Eyes/ENT: (-) blurry vision, (-) epistaxis  Cardiovascular: (-) chest pain, (-) syncope  Respiratory: (-) cough, (-) shortness of breath  Gastrointestinal: (-) vomiting, (-) diarrhea  Musculoskeletal: (-) neck pain, (-) back pain, (+) R elbow pain, (+) R hand 5th MCP pain   Integumentary: (-) rash, (-) edema  Neurological: (-) headache, (-) altered mental status  Psychiatric: (-) hallucinations  Allergic/Immunologic: (-) pruritus

## 2021-02-07 NOTE — ED PROVIDER NOTE - CARE PROVIDER_API CALL
Jace Kaye (DO)  Internal Medicine  [address] [place] [address], [address]  [place] [address]  Phone: [phone]  Fax: [phone]  Follow Up Time: 1-3 Days

## 2021-02-07 NOTE — ED PROVIDER NOTE - PHYSICAL EXAMINATION
Vital Signs: I have reviewed the initial vital signs.  Constitutional: well-nourished, appears stated age, no acute distress  Cardiovascular: regular rate, regular rhythm, well-perfused extremities  Respiratory: unlabored respiratory effort, clear to auscultation bilaterally  Gastrointestinal: soft, non-tender abdomen  Musculoskeletal: supple neck, no lower extremity edema. R elbow, hand MCPs without erythema, swelling, ecchymosis.   Integumentary: warm, dry, no rash  Neurologic: awake, alert, extremities’ motor and sensory functions grossly intact  Psychiatric: appropriate mood, appropriate affect

## 2021-02-07 NOTE — ED PROVIDER NOTE - CLINICAL SUMMARY MEDICAL DECISION MAKING FREE TEXT BOX
RA flare. No sign of septic arthritis.  A/P: Will restart prednisone. Patient has f/u with rheumatology Dr. Ventura tomorrow.

## 2021-02-07 NOTE — ED PROVIDER NOTE - OBJECTIVE STATEMENT
The pt is a 35y M w/ hx of RA chronically on prednisone presenting for med refill. Pt sees Dr. Kaye (Rheumatology) and thinks he has an appointment tomorrow or sometime this week but ran out of prednisone. Last dose was 15 mg yesterday. He endorses discomfort to R hand 5th MCP and R elbow. This feels similar to pains he gets when he does not take his prednisone. He is in the process of getting approved for Humira but has not started yet. Denies fever, headache, chest pain, SOB, cough, N/V, abdominal pain, diarrhea.

## 2021-02-07 NOTE — ED PROVIDER NOTE - PATIENT PORTAL LINK FT
You can access the FollowMyHealth Patient Portal offered by [facility] by registering at the following website: [email or web address]. By joining [employer]’s FollowMyHealth portal, you will also be able to view your health information using other applications (apps) compatible with our system.

## 2021-02-07 NOTE — ED PROVIDER NOTE - NSFOLLOWUPINSTRUCTIONS_ED_ALL_ED_FT
Please keep your follow up appointment with Rheumatology at [address] [place] [address].     Return to the Emergency Room for any new or worsening symptoms.

## 2021-02-08 ENCOUNTER — APPOINTMENT (OUTPATIENT)
Dept: RHEUMATOLOGY | Facility: CLINIC | Age: 36
End: 2021-02-08
Payer: MEDICAID

## 2021-02-08 VITALS
WEIGHT: 165 LBS | SYSTOLIC BLOOD PRESSURE: 130 MMHG | TEMPERATURE: 97 F | HEART RATE: 97 BPM | HEIGHT: 72 IN | BODY MASS INDEX: 22.35 KG/M2 | OXYGEN SATURATION: 98 % | DIASTOLIC BLOOD PRESSURE: 80 MMHG

## 2021-02-08 PROCEDURE — 99214 OFFICE O/P EST MOD 30 MIN: CPT

## 2021-02-08 PROCEDURE — 99072 ADDL SUPL MATRL&STAF TM PHE: CPT

## 2021-02-08 NOTE — PHYSICAL EXAM
[General Appearance - In No Acute Distress] : in no acute distress [General Appearance - Alert] : alert [Sclera] : the sclera and conjunctiva were normal [PERRL With Normal Accommodation] : pupils were equal in size, round, and reactive to light [Extraocular Movements] : extraocular movements were intact [Outer Ear] : the ears and nose were normal in appearance [Oropharynx] : the oropharynx was normal [Neck Appearance] : the appearance of the neck was normal [Neck Cervical Mass (___cm)] : no neck mass was observed [Jugular Venous Distention Increased] : there was no jugular-venous distention [Thyroid Diffuse Enlargement] : the thyroid was not enlarged [Thyroid Nodule] : there were no palpable thyroid nodules [Heart Rate And Rhythm] : heart rate was normal and rhythm regular [Auscultation Breath Sounds / Voice Sounds] : lungs were clear to auscultation bilaterally [Heart Sounds] : normal S1 and S2 [Heart Sounds Gallop] : no gallops [Murmurs] : no murmurs [Heart Sounds Pericardial Friction Rub] : no pericardial rub [Bowel Sounds] : normal bowel sounds [Abdomen Soft] : soft [Abdomen Tenderness] : non-tender [] : no hepato-splenomegaly [Abdomen Mass (___ Cm)] : no abdominal mass palpated [Abnormal Walk] : normal gait [Nail Clubbing] : no clubbing  or cyanosis of the fingernails [Motor Tone] : muscle strength and tone were normal [Musculoskeletal - Swelling] : no joint swelling seen [FreeTextEntry1] : Rash over abdomen and scalp [Sensation] : the sensory exam was normal to light touch and pinprick [Motor Exam] : the motor exam was normal [Affect] : the affect was normal [Mood] : the mood was normal

## 2021-02-08 NOTE — REASON FOR VISIT
[Home] : at home, [unfilled] , at the time of the visit. [Medical Office: ([facility])___] : at the medical office located in

## 2021-02-08 NOTE — HISTORY OF PRESENT ILLNESS
[FreeTextEntry1] : 35 year old healthy male here for follow up.\par \par Brief history:\par Patient reports persistent right 3rd PIP swelling and pain, right wrist pain and swelling, and new rash that just developed. It initially start on his left abdomen/flank and spread throughout his body. He has an appointment to see dermatology upcoming. Reports bilateral elbows lock up, knees are painful without swelling, and prolonged AM stiffness throughout. He is out of prednisone that his PCP prescribed for him and reports worsening pain. \par \par Denies enthesitis, dactylitis, uveitis, psoriasis, recent infection, inflammatory bowel disease or low back stiffness.\par \par Today's visit:\par Patient ran out of prednisone yesterday and went to the ER who gave him prednisone 20 mg. He said 15 mg prednisone he did well on but 3-4 days after going to 10 mg of prednisone he felt pain in his left hand around 11 pm at night and in the morning felt pain as well; resolved with prednisone 10 mg administration the following AM. 15 mg prednisone while on this he didn't get pains at night. Had mix up with dermatologist appointment with plans to reschedule soon. Didn't  Humira yet.

## 2021-02-08 NOTE — ASSESSMENT
[FreeTextEntry1] : Pleasant 35 year old male with no past medical history here for follow up.\par \par \par 1. Inflammatory arthritis likely psoriatic arthritis vs. seronegative RA\par \par -Negative MEENA, RF, CCP. Normal ESR and CRP in 9/2020 \par -X-ray of right foot, bilateral knees,  bilateral ankles negative in 2019\par -X-ray of bilateral knees and elbows negative. Bilateral hands show diffuse swelling of right third PIP in 8/2020 \par -Admitted 9/2020 for hand pain and right hand x-ray suggested worsening PIP swelling, erosion, and periostitis that are suggestive of psoriatic arthritis. MRI to follow up was recommended in report. Right wrist and elbow x-ray unrevealing\par -Erythema bilateral palms including on lateral side with new outbreak of rash ? post inflammatory hyperpigmentation related to underlying psoriasis. \par -Check MRI right hand \par -Start Humira 40 mg q2 weeks\par -Re-start prednisone 15 mg x 2 weeks, 10 mg x 2 weeks then stop\par -F/u 6 weeks\par -F/u dermatology\par \par \par \par

## 2021-04-18 ENCOUNTER — EMERGENCY (EMERGENCY)
Facility: HOSPITAL | Age: 36
LOS: 0 days | Discharge: HOME | End: 2021-04-18
Attending: STUDENT IN AN ORGANIZED HEALTH CARE EDUCATION/TRAINING PROGRAM | Admitting: STUDENT IN AN ORGANIZED HEALTH CARE EDUCATION/TRAINING PROGRAM
Payer: MEDICAID

## 2021-04-18 VITALS
OXYGEN SATURATION: 99 % | DIASTOLIC BLOOD PRESSURE: 87 MMHG | TEMPERATURE: 98 F | HEART RATE: 77 BPM | SYSTOLIC BLOOD PRESSURE: 133 MMHG | RESPIRATION RATE: 18 BRPM | HEIGHT: 72 IN

## 2021-04-18 DIAGNOSIS — Z88.8 ALLERGY STATUS TO OTHER DRUGS, MEDICAMENTS AND BIOLOGICAL SUBSTANCES STATUS: ICD-10-CM

## 2021-04-18 DIAGNOSIS — S62.501A FRACTURE OF UNSPECIFIED PHALANX OF RIGHT THUMB, INITIAL ENCOUNTER FOR CLOSED FRACTURE: ICD-10-CM

## 2021-04-18 DIAGNOSIS — M79.644 PAIN IN RIGHT FINGER(S): ICD-10-CM

## 2021-04-18 DIAGNOSIS — S00.81XA ABRASION OF OTHER PART OF HEAD, INITIAL ENCOUNTER: ICD-10-CM

## 2021-04-18 DIAGNOSIS — S21.159A: ICD-10-CM
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
## 2021-04-18 DIAGNOSIS — Z23 ENCOUNTER FOR IMMUNIZATION: ICD-10-CM
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
## 2021-04-18 DIAGNOSIS — S20.212A CONTUSION OF LEFT FRONT WALL OF THORAX, INITIAL ENCOUNTER: ICD-10-CM

## 2021-04-18 DIAGNOSIS — Z79.899 OTHER LONG TERM (CURRENT) DRUG THERAPY: ICD-10-CM

## 2021-04-18 DIAGNOSIS — S20.319A ABRASION OF UNSPECIFIED FRONT WALL OF THORAX, INITIAL ENCOUNTER: ICD-10-CM

## 2021-04-18 PROCEDURE — 99284 EMERGENCY DEPT VISIT MOD MDM: CPT | Mod: 25

## 2021-04-18 PROCEDURE — 29130 APPL FINGER SPLINT STATIC: CPT

## 2021-04-18 PROCEDURE — 73140 X-RAY EXAM OF FINGER(S): CPT | Mod: 26,RT

## 2021-04-18 RX ORDER — TETANUS TOXOID, REDUCED DIPHTHERIA TOXOID AND ACELLULAR PERTUSSIS VACCINE, ADSORBED 5; 2.5; 8; 8; 2.5 [IU]/.5ML; [IU]/.5ML; UG/.5ML; UG/.5ML; UG/.5ML
0.5 SUSPENSION INTRAMUSCULAR ONCE
Refills: 0 | Status: COMPLETED | OUTPATIENT
Start: 2021-04-18 | End: 2021-04-18

## 2021-04-18 RX ORDER — ACETAMINOPHEN 500 MG
975 TABLET ORAL ONCE
Refills: 0 | Status: COMPLETED | OUTPATIENT
Start: 2021-04-18 | End: 2021-04-18

## 2021-04-18 RX ADMIN — TETANUS TOXOID, REDUCED DIPHTHERIA TOXOID AND ACELLULAR PERTUSSIS VACCINE, ADSORBED 0.5 MILLILITER(S): 5; 2.5; 8; 8; 2.5 SUSPENSION INTRAMUSCULAR at 19:22

## 2021-04-18 RX ADMIN — Medication 975 MILLIGRAM(S): at 19:21
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
## 2021-04-18 NOTE — ED PROVIDER NOTE - NS ED ROS FT
Constitutional: (-) fever  Eyes/ENT: (-) blurry vision, (-) epistaxis  Cardiovascular: (-) chest pain, (-) syncope  Respiratory: (-) cough, (-) shortness of breath  Gastrointestinal: (-) vomiting, (-) diarrhea  : (-) dysuria, (-) hematuria  Musculoskeletal: (+) joint pain, (-) neck pain, (-) back pain  Integumentary: (+) bite, (-) rash, (-) edema  Neurological: (-) headache, (-) altered mental status  Allergic/Immunologic: (-) pruritus

## 2021-04-18 NOTE — ED PROVIDER NOTE - CARE PROVIDER_API CALL
Evan Granda)  Orthopaedic Surgery  [address] [place] [address]  Phone: [phone]  Fax: [phone]  Follow Up Time:
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
## 2021-04-18 NOTE — ED PROVIDER NOTE - PHYSICAL EXAMINATION
CONST: NAD  EYES: Sclera and conjunctiva clear.   ENT: No nasal discharge. Oropharynx normal appearing  NECK: Non-tender, no meningeal signs. normal ROM. supple   CARD: S1 S2; No jvd  RESP: Equal BS B/L, No wheezes, rhonchi or rales. No distress  GI: Soft, non-tender, non-distended. no cva tenderness. normal BS  MS: R 1st finger tenderness. Normal ROM in all extremities. pulses 2 +. no calf tenderness or swelling  SKIN: Multiple abrasion across trunk and face, large circular abrasion to L chest with central ecchymosis  NEURO: A&Ox3, No focal deficits. Strength 5/5 with no sensory deficits. Steady gait.
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
## 2021-04-18 NOTE — ED PROVIDER NOTE - PATIENT PORTAL LINK FT
You can access the FollowMyHealth Patient Portal offered by [facility] by registering at the following website: [email or web address]. By joining [employer]’s FollowMyHealth portal, you will also be able to view your health information using other applications (apps) compatible with our system.

## 2021-04-18 NOTE — ED PROVIDER NOTE - NSFOLLOWUPCLINICS_GEN_ALL_ED_FT
[facility]  Orthpedic  [address], [place]   Phone: [phone]  Fax:

## 2021-05-17 ENCOUNTER — APPOINTMENT (OUTPATIENT)
Dept: RHEUMATOLOGY | Facility: CLINIC | Age: 36
End: 2021-05-17
Payer: MEDICAID

## 2021-05-17 VITALS
BODY MASS INDEX: 22.35 KG/M2 | HEIGHT: 72 IN | OXYGEN SATURATION: 98 % | HEART RATE: 78 BPM | DIASTOLIC BLOOD PRESSURE: 80 MMHG | SYSTOLIC BLOOD PRESSURE: 138 MMHG | TEMPERATURE: 97.8 F | WEIGHT: 165 LBS

## 2021-05-17 PROCEDURE — 99072 ADDL SUPL MATRL&STAF TM PHE: CPT

## 2021-05-17 PROCEDURE — 99214 OFFICE O/P EST MOD 30 MIN: CPT

## 2021-05-17 RX ORDER — PREDNISONE 10 MG/1
10 TABLET ORAL
Qty: 60 | Refills: 1 | Status: DISCONTINUED | COMMUNITY
Start: 2020-12-08 | End: 2021-05-17

## 2021-05-18 LAB
ALBUMIN SERPL ELPH-MCNC: 4.4 G/DL
ALP BLD-CCNC: 59 U/L
ALT SERPL-CCNC: 35 U/L
ANION GAP SERPL CALC-SCNC: 15 MMOL/L
AST SERPL-CCNC: 43 U/L
BASOPHILS # BLD AUTO: 0.03 K/UL
BASOPHILS NFR BLD AUTO: 0.5 %
BILIRUB SERPL-MCNC: 0.4 MG/DL
BUN SERPL-MCNC: 9 MG/DL
CALCIUM SERPL-MCNC: 9.4 MG/DL
CHLORIDE SERPL-SCNC: 103 MMOL/L
CO2 SERPL-SCNC: 21 MMOL/L
CREAT SERPL-MCNC: 0.8 MG/DL
CRP SERPL-MCNC: <3 MG/L
EOSINOPHIL # BLD AUTO: 0.05 K/UL
EOSINOPHIL NFR BLD AUTO: 0.8 %
ERYTHROCYTE [SEDIMENTATION RATE] IN BLOOD BY WESTERGREN METHOD: 20 MM/HR
GLUCOSE SERPL-MCNC: 86 MG/DL
HCT VFR BLD CALC: 45.3 %
HGB BLD-MCNC: 15.4 G/DL
IMM GRANULOCYTES NFR BLD AUTO: 0.6 %
LYMPHOCYTES # BLD AUTO: 2.19 K/UL
LYMPHOCYTES NFR BLD AUTO: 34.5 %
MAN DIFF?: NORMAL
MCHC RBC-ENTMCNC: 32.2 PG
MCHC RBC-ENTMCNC: 34 G/DL
MCV RBC AUTO: 94.8 FL
MONOCYTES # BLD AUTO: 0.83 K/UL
MONOCYTES NFR BLD AUTO: 13.1 %
NEUTROPHILS # BLD AUTO: 3.21 K/UL
NEUTROPHILS NFR BLD AUTO: 50.5 %
PLATELET # BLD AUTO: 228 K/UL
POTASSIUM SERPL-SCNC: 4.2 MMOL/L
PROT SERPL-MCNC: 7.4 G/DL
RBC # BLD: 4.78 M/UL
RBC # FLD: 13 %
SODIUM SERPL-SCNC: 139 MMOL/L
WBC # FLD AUTO: 6.35 K/UL

## 2021-05-19 NOTE — ASSESSMENT
[FreeTextEntry1] : Pleasant 35 year old male with no past medical history here for follow up.\par \par \par 1. Inflammatory arthritis/ seronegative RA\par \par -Negative MEENA, RF, CCP. Normal ESR and CRP in 9/2020 \par -X-ray of right foot, bilateral knees,  bilateral ankles negative in 2019\par -X-ray of bilateral knees and elbows negative. Bilateral hands show diffuse swelling of right third PIP in 8/2020 \par -Admitted 9/2020 for hand pain and right hand x-ray suggested worsening PIP swelling, erosion, and periostitis that are suggestive of psoriatic arthritis. MRI to follow up was recommended in report. Right wrist and elbow x-ray unrevealing\par -Check MRI right hand \par -Continue Humira 40 mg q2 weeks\par -Labs reviewed\par -F/u 3 months with labs\par \par \par

## 2021-05-19 NOTE — HISTORY OF PRESENT ILLNESS
[FreeTextEntry1] : 35 year old healthy male here for follow up.\par \par Brief history:\par Patient reports persistent right 3rd PIP swelling and pain, right wrist pain and swelling, and new rash that just developed. It initially start on his left abdomen/flank and spread throughout his body. He has an appointment to see dermatology upcoming. Reports bilateral elbows lock up, knees are painful without swelling, and prolonged AM stiffness throughout. He is out of prednisone that his PCP prescribed for him and reports worsening pain. \par \par Denies enthesitis, dactylitis, uveitis, psoriasis, recent infection, inflammatory bowel disease or low back stiffness.\par \par Today's visit:\par Reports symptoms are significantly improved with Humira. Two weeks ago he developed a  pain, redness, itching that lasts for 36 hours and then migrates from big toe to the rest of toes, fingers, elbows, scrotum, right buttock, right right hip (first place to swell). Off prednisone. Has problems with right 3rd finger still. Denies joint pain, swelling or stiffness.

## 2021-05-19 NOTE — PHYSICAL EXAM
[General Appearance - Alert] : alert [General Appearance - In No Acute Distress] : in no acute distress [Sclera] : the sclera and conjunctiva were normal [PERRL With Normal Accommodation] : pupils were equal in size, round, and reactive to light [Extraocular Movements] : extraocular movements were intact [Outer Ear] : the ears and nose were normal in appearance [Oropharynx] : the oropharynx was normal [Neck Appearance] : the appearance of the neck was normal [Neck Cervical Mass (___cm)] : no neck mass was observed [Jugular Venous Distention Increased] : there was no jugular-venous distention [Thyroid Diffuse Enlargement] : the thyroid was not enlarged [Thyroid Nodule] : there were no palpable thyroid nodules [Auscultation Breath Sounds / Voice Sounds] : lungs were clear to auscultation bilaterally [Heart Rate And Rhythm] : heart rate was normal and rhythm regular [Heart Sounds] : normal S1 and S2 [Heart Sounds Gallop] : no gallops [Murmurs] : no murmurs [Heart Sounds Pericardial Friction Rub] : no pericardial rub [Bowel Sounds] : normal bowel sounds [Abdomen Soft] : soft [Abdomen Tenderness] : non-tender [] : no hepato-splenomegaly [Abdomen Mass (___ Cm)] : no abdominal mass palpated [Abnormal Walk] : normal gait [Nail Clubbing] : no clubbing  or cyanosis of the fingernails [Musculoskeletal - Swelling] : no joint swelling seen [Motor Tone] : muscle strength and tone were normal [Sensation] : the sensory exam was normal to light touch and pinprick [Motor Exam] : the motor exam was normal [Affect] : the affect was normal [Mood] : the mood was normal [FreeTextEntry1] : Rash over abdomen and scalp

## 2021-06-18 ENCOUNTER — EMERGENCY (EMERGENCY)
Facility: HOSPITAL | Age: 36
LOS: 0 days | Discharge: HOME | End: 2021-06-18
Attending: EMERGENCY MEDICINE | Admitting: EMERGENCY MEDICINE
Payer: MEDICAID

## 2021-06-18 VITALS
HEART RATE: 80 BPM | TEMPERATURE: 98 F | RESPIRATION RATE: 18 BRPM | OXYGEN SATURATION: 99 % | WEIGHT: 169.98 LBS | DIASTOLIC BLOOD PRESSURE: 59 MMHG | SYSTOLIC BLOOD PRESSURE: 134 MMHG | HEIGHT: 72 IN

## 2021-06-18 DIAGNOSIS — Z79.899 OTHER LONG TERM (CURRENT) DRUG THERAPY: ICD-10-CM

## 2021-06-18 DIAGNOSIS — M79.89 OTHER SPECIFIED SOFT TISSUE DISORDERS: ICD-10-CM

## 2021-06-18 DIAGNOSIS — M25.532 PAIN IN LEFT WRIST: ICD-10-CM

## 2021-06-18 DIAGNOSIS — M06.9 RHEUMATOID ARTHRITIS, UNSPECIFIED: ICD-10-CM

## 2021-06-18 DIAGNOSIS — M79.642 PAIN IN LEFT HAND: ICD-10-CM

## 2021-06-18 DIAGNOSIS — Z88.8 ALLERGY STATUS TO OTHER DRUGS, MEDICAMENTS AND BIOLOGICAL SUBSTANCES STATUS: ICD-10-CM

## 2021-06-18 PROCEDURE — 73130 X-RAY EXAM OF HAND: CPT | Mod: 26,LT

## 2021-06-18 PROCEDURE — 99283 EMERGENCY DEPT VISIT LOW MDM: CPT

## 2021-06-18 RX ADMIN — Medication 60 MILLIGRAM(S): at 13:20

## 2021-06-18 NOTE — ED PROVIDER NOTE - CLINICAL SUMMARY MEDICAL DECISION MAKING FREE TEXT BOX
34 yo man with left hand swelling today but has had similar episodes in different parts of his body that come and go over the past few months.  Not painful.  No evidence of infection.  Likely related to rheum.  XRAY ok.  Prednisone and outpatient follow up with plan to return for any new or worsening.

## 2021-06-18 NOTE — ED PROVIDER NOTE - OBJECTIVE STATEMENT
36 yo F pmhx RA (on Humera) presenting to the ED for evaluation of atraumatic L hand/wrist swelling since last night, pt notes over the past few months he has been experiencing swelling to random parts of his body including hips, knees, lips, all at different times, resolves on its own after a couple of days. Pt states today he woke up with his L hand swollen, mild discomfort localized to the L hand/wrist. Denies any alleviating or aggravating factors. Admits to drinking 1 "small bottle" of alcohol every other day. Pt states he follows up with his Rheumatologist every few months, has not had an appointment in a couple of months. Denies fever, chills, nausea, vomiting, chest pain, sob, numbness/tingling, trauma.

## 2021-06-18 NOTE — ED PROVIDER NOTE - PHYSICAL EXAMINATION
GENERAL: Well-nourished, Well-developed. NAD.  HEAD: No visible or palpable bumps or hematomas. No ecchymosis behind ears B/L.  Eyes: PERRLA, EOMI. No asymmetry. No nystagmus. No conjunctival injection. Non-icteric sclera.  ENMT: MMM.   Neck: Supple. No cervical midline TTP. No paravertebral TTP to traps. FROM  CVS: RRR.  MSK: (+)swelling and mild ttp L hand. No visible signs of trauma such as ecchymosis, or erythema. FROM of upper and lower extremities B/L. No midline spinal TTP. FROM of back with flexion and extension.  Skin: Warm, Dry. No rashes or lesions. Good cap refill < 2 sec B/L.  EXT: Radial pulses present B/L.   Neuro: NVI

## 2021-06-18 NOTE — ED PROVIDER NOTE - PATIENT PORTAL LINK FT
You can access the FollowMyHealth Patient Portal offered by [facility] by registering at the following website: [email or web address]. By joining [employer]’s FollowMyHealth portal, you will also be able to view your health information using other applications (apps) compatible with our system.

## 2021-06-18 NOTE — ED PROVIDER NOTE - ATTENDING CONTRIBUTION TO CARE
I personally evaluated the patient. I reviewed the Resident’s or Physician Assistant’s note (as assigned above), and agree with the findings and plan except as documented in my note.  34 yo man with known RA on Humira presents to ED with migratory swelling in his body that comes and goes and affects different parts of his body.  Currently affecting his left hand.  Does not appear to be gout or infectious.  Minimally painful.  Patient otherwise feels well.  No systemic complaints. XRAY was done and unremarkable.  No traumatic history.  I believe this is related to his underlying rheumatologic disease.  I do not believe lab work would be beneficial.  I believe corticosteroids may be helpful with little downside.  Stable for discharge and plan for return for any new or worsening.

## 2021-06-18 NOTE — ED PROVIDER NOTE - NS ED ROS FT
Constitutional: No fever, chills.  Eyes:  No visual changes  ENMT:  No neck pain  Cardiac:  No chest pain  Respiratory:  No cough, SOB  GI:  No nausea, vomiting, diarrhea, abdominal pain.  :  No dysuria, hematuria  MS:  (+)L hand/wrist swelling and discomfort  Neuro:  No numbness/tingling  Skin:  No skin rash or bruising  Endocrine: No history of thyroid disease or diabetes.  Except as documented in the HPI,  all other systems are negative.

## 2021-06-18 NOTE — ED PROVIDER NOTE - NSFOLLOWUPINSTRUCTIONS_ED_ALL_ED_FT
***FOLLOW UP WITH YOUR RHEUMATOLOGIST WITHIN 1 WEEK. TAKE STEROIDS AS PRESCRIBED.***    Many things can cause hand pain. Some common causes are:  An injury.  Repeating the same movement with your hand over and over (overuse).  Osteoporosis.  Arthritis.  Lumps in the tendons or joints of the hand and wrist (ganglion cysts).  Infection.  Follow these instructions at home:  Pay attention to any changes in your symptoms. Take these actions to help with your discomfort:  If directed, put ice on the affected area:  Put ice in a plastic bag.  Place a towel between your skin and the bag.  Leave the ice on for 15–20 minutes, 3?4 times a day for 2 days.  Take over-the-counter and prescription medicines only as told by your health care provider.  Minimize stress on your hands and wrists as much as possible.  Take breaks from repetitive activity often.  Do stretches as told by your health care provider.  Do not do activities that make your pain worse.  Contact a health care provider if:  Your pain does not get better after a few days of self-care.  Your pain gets worse.  Your pain affects your ability to do your daily activities.  Get help right away if:  Your hand becomes warm, red, or swollen.  Your hand is numb or tingling.  Your hand is extremely swollen or deformed.  Your hand or fingers turn white or blue.  You cannot move your hand, wrist, or fingers.  This information is not intended to replace advice given to you by your health care provider. Make sure you discuss any questions you have with your health care provider.

## 2021-07-23 PROBLEM — M06.9 RHEUMATOID ARTHRITIS, UNSPECIFIED: Chronic | Status: ACTIVE | Noted: 2021-06-18

## 2021-07-29 ENCOUNTER — APPOINTMENT (OUTPATIENT)
Dept: RHEUMATOLOGY | Facility: CLINIC | Age: 36
End: 2021-07-29
Payer: MEDICAID

## 2021-07-29 VITALS
SYSTOLIC BLOOD PRESSURE: 140 MMHG | BODY MASS INDEX: 25.06 KG/M2 | HEART RATE: 74 BPM | TEMPERATURE: 97 F | HEIGHT: 72 IN | OXYGEN SATURATION: 98 % | WEIGHT: 185 LBS | DIASTOLIC BLOOD PRESSURE: 90 MMHG

## 2021-07-29 PROCEDURE — 99214 OFFICE O/P EST MOD 30 MIN: CPT

## 2021-07-29 RX ORDER — ADALIMUMAB 40MG/0.4ML
40 KIT SUBCUTANEOUS
Qty: 1 | Refills: 2 | Status: DISCONTINUED | COMMUNITY
Start: 2021-03-08 | End: 2021-07-29

## 2021-07-29 NOTE — ED ADULT NURSE NOTE - PAIN RATING/NUMBER SCALE (0-10): REST
Detail Level: Detailed Quality 130: Documentation Of Current Medications In The Medical Record: Current Medications Documented 6

## 2021-10-01 PROCEDURE — G9005: CPT

## 2021-11-11 DIAGNOSIS — Z79.899 OTHER LONG TERM (CURRENT) DRUG THERAPY: ICD-10-CM

## 2021-12-16 LAB
ALBUMIN SERPL ELPH-MCNC: 4.5 G/DL
ALP BLD-CCNC: 48 U/L
ALT SERPL-CCNC: 17 U/L
ANION GAP SERPL CALC-SCNC: 14 MMOL/L
AST SERPL-CCNC: 27 U/L
BASOPHILS # BLD AUTO: 0.04 K/UL
BASOPHILS NFR BLD AUTO: 0.6 %
BILIRUB SERPL-MCNC: 0.4 MG/DL
BUN SERPL-MCNC: 11 MG/DL
CALCIUM SERPL-MCNC: 9.5 MG/DL
CHLORIDE SERPL-SCNC: 102 MMOL/L
CO2 SERPL-SCNC: 22 MMOL/L
CREAT SERPL-MCNC: 0.9 MG/DL
CRP SERPL-MCNC: <3 MG/L
EOSINOPHIL # BLD AUTO: 0.09 K/UL
EOSINOPHIL NFR BLD AUTO: 1.4 %
ERYTHROCYTE [SEDIMENTATION RATE] IN BLOOD BY WESTERGREN METHOD: 5 MM/HR
GLUCOSE SERPL-MCNC: 90 MG/DL
HCT VFR BLD CALC: 44.9 %
HGB BLD-MCNC: 15.2 G/DL
IMM GRANULOCYTES NFR BLD AUTO: 0.3 %
LYMPHOCYTES # BLD AUTO: 2.88 K/UL
LYMPHOCYTES NFR BLD AUTO: 44.9 %
MAN DIFF?: NORMAL
MCHC RBC-ENTMCNC: 32.5 PG
MCHC RBC-ENTMCNC: 33.9 G/DL
MCV RBC AUTO: 96.1 FL
MONOCYTES # BLD AUTO: 0.68 K/UL
MONOCYTES NFR BLD AUTO: 10.6 %
NEUTROPHILS # BLD AUTO: 2.71 K/UL
NEUTROPHILS NFR BLD AUTO: 42.2 %
PLATELET # BLD AUTO: 187 K/UL
POTASSIUM SERPL-SCNC: 4.6 MMOL/L
PROT SERPL-MCNC: 7.6 G/DL
RBC # BLD: 4.67 M/UL
RBC # FLD: 12.8 %
SODIUM SERPL-SCNC: 138 MMOL/L
WBC # FLD AUTO: 6.42 K/UL

## 2021-12-20 ENCOUNTER — APPOINTMENT (OUTPATIENT)
Dept: RHEUMATOLOGY | Facility: CLINIC | Age: 36
End: 2021-12-20
Payer: MEDICAID

## 2021-12-30 ENCOUNTER — APPOINTMENT (OUTPATIENT)
Dept: RHEUMATOLOGY | Facility: CLINIC | Age: 36
End: 2021-12-30
Payer: MEDICAID

## 2021-12-30 VITALS — HEIGHT: 72 IN | BODY MASS INDEX: 25.06 KG/M2 | WEIGHT: 185 LBS

## 2021-12-30 DIAGNOSIS — R21 RASH AND OTHER NONSPECIFIC SKIN ERUPTION: ICD-10-CM

## 2021-12-30 PROCEDURE — 99214 OFFICE O/P EST MOD 30 MIN: CPT

## 2021-12-30 NOTE — HISTORY OF PRESENT ILLNESS
[FreeTextEntry1] : 35 year old healthy male with a history of seronegative RA on Humira here for follow up.\par \par \par 12/30/21:\par He reports well circumscribed red raised and painful skin lesions that come and go last a few hours to a few days and resolve. He gets chin and lip swelling (showed me pictures). He takes prednisone and benadryl when this happens. He denies AM stiffness in joints. Sometimes gets left knee swelling. He gets migratory joint pains. He takes Humira without issues. \par \par 7/29/21:\par Patient reports swelling over various parts of his body including his wrists, feet, and buttock area that resolves after a few days. It's associated with pain.  He denies significant joint stiffness in the morning he has no joint swelling today. He reports being out of Humira as of late he doesn't remember the last injection. Denies skin rashes, visual problems, or fevers. Reports intermittent lip swelling without throat swelling or dyspnea, he takes a benadryl and motrin and symptoms resolve.\par \par Brief history:\par Patient reports persistent right 3rd PIP swelling and pain, right wrist pain and swelling, and new rash that just developed. It initially start on his left abdomen/flank and spread throughout his body. He has an appointment to see dermatology upcoming. Reports bilateral elbows lock up, knees are painful without swelling, and prolonged AM stiffness throughout. He is out of prednisone that his PCP prescribed for him and reports worsening pain. \par \par Denies enthesitis, dactylitis, uveitis, psoriasis, recent infection, inflammatory bowel disease or low back stiffness.\par \par

## 2021-12-30 NOTE — ASSESSMENT
[FreeTextEntry1] : Pleasant 35 year old male with a history of seronegative RA here for follow up.\par \par \par 1. Inflammatory arthritis/ seronegative, erosive RA\par -Negative MEENA, RF, CCP. Normal ESR and CRP in 9/2020 \par -X-ray of right foot, bilateral knees,  bilateral ankles negative in 2019\par -X-ray of bilateral knees and elbows negative. Bilateral hands show diffuse swelling of right third PIP in 8/2020 \par -Admitted 9/2020 for hand pain and right hand x-ray suggested worsening PIP swelling, erosion, and periostitis that are suggestive of psoriatic arthritis. Right wrist and elbow x-ray unrevealing\par -Symptoms respond to Humira and steroids\par -Labs reviewed\par -Continue Humira 40 mg SQ q 2 weeks\par -talked to him about starting MTX or Leflunomide he drinks ETOH he will think about stopping to start DMARD therapy and let me know. I advised for him to start DMARD \par -Prednisone 40 mg daily until resolution of symptoms for lip/facial swelling\par -See dermatology for biopsy of skin lesions (none today)\par -F/u 3 months with labs \par \par \par \par \par \par

## 2021-12-30 NOTE — PHYSICAL EXAM
[General Appearance - Alert] : alert [General Appearance - In No Acute Distress] : in no acute distress [Sclera] : the sclera and conjunctiva were normal [PERRL With Normal Accommodation] : pupils were equal in size, round, and reactive to light [Extraocular Movements] : extraocular movements were intact [Outer Ear] : the ears and nose were normal in appearance [Oropharynx] : the oropharynx was normal [Neck Appearance] : the appearance of the neck was normal [Neck Cervical Mass (___cm)] : no neck mass was observed [Jugular Venous Distention Increased] : there was no jugular-venous distention [Thyroid Diffuse Enlargement] : the thyroid was not enlarged [Thyroid Nodule] : there were no palpable thyroid nodules [Auscultation Breath Sounds / Voice Sounds] : lungs were clear to auscultation bilaterally [Heart Rate And Rhythm] : heart rate was normal and rhythm regular [Heart Sounds] : normal S1 and S2 [Heart Sounds Gallop] : no gallops [Murmurs] : no murmurs [Heart Sounds Pericardial Friction Rub] : no pericardial rub [Bowel Sounds] : normal bowel sounds [Abdomen Soft] : soft [Abdomen Tenderness] : non-tender [] : no hepato-splenomegaly [Abdomen Mass (___ Cm)] : no abdominal mass palpated [Abnormal Walk] : normal gait [Nail Clubbing] : no clubbing  or cyanosis of the fingernails [Musculoskeletal - Swelling] : no joint swelling seen [Motor Tone] : muscle strength and tone were normal [FreeTextEntry1] : Rash over abdomen and scalp [Motor Exam] : the motor exam was normal [Sensation] : the sensory exam was normal to light touch and pinprick [Affect] : the affect was normal [Mood] : the mood was normal

## 2022-01-06 NOTE — ED ADULT TRIAGE NOTE - CCCP TRG CHIEF CMPLNT
Final Anesthesia Post-op Assessment    Patient: Demondashley Chen  Procedure(s) Performed: CIRCUMISION WITH PENOPLASTY; DIAGNOSTIC LAPAROSCOPY,  LAPAROSCOPIC ASSISTED LEFT ORCHIOPEXY - LEFTLEFT INGUINAL ORCHIOPEXY, DIAGNOSTIC LAPAROSCOPY, POSSIBLE LAPAROSCOPIC ASSISTED LEFT ORCHIOPEXY - LEFT  Anesthesia type: General    Vitals Value Taken Time   Temp 36.7 01/06/22 1238   Pulse 125 01/06/22 1238   Resp 24 01/06/22 1238   SpO2 99% 01/06/22 1238   BP 97/50 01/06/22 1238         Patient Location: PACU Phase 1  Post-op Vital Signs:stable  Level of Consciousness: sedated  Respiratory Status: spontaneous ventilation  Cardiovascular blood pressure returned to baseline  Hydration: euvolemic  Pain Management: well controlled  Handoff: Handoff to receiving clinician was performed and questions were answered  Vomiting: none  Nausea: None  Airway Patency:patent  Post-op Assessment: no complications, patient tolerated procedure well with no complications, dentition within defined limits, moving all extremities and No Corneal Abrasion      No complications documented.   
weakness

## 2022-02-22 NOTE — ED ADULT NURSE NOTE - NS ED NURSE RECORD ANOTHER HT AND WT
Observation goals  PRIOR TO DISCHARGE        Comments:     -Placement to TCU: not met           Yes

## 2022-04-04 ENCOUNTER — APPOINTMENT (OUTPATIENT)
Dept: RHEUMATOLOGY | Facility: CLINIC | Age: 37
End: 2022-04-04

## 2022-07-01 ENCOUNTER — INPATIENT (INPATIENT)
Facility: HOSPITAL | Age: 37
LOS: 0 days | Discharge: LEFT BEFORE DISCHARGE | End: 2022-07-02
Attending: INTERNAL MEDICINE | Admitting: INTERNAL MEDICINE

## 2022-07-01 VITALS
HEIGHT: 72 IN | DIASTOLIC BLOOD PRESSURE: 93 MMHG | WEIGHT: 179.9 LBS | OXYGEN SATURATION: 97 % | SYSTOLIC BLOOD PRESSURE: 135 MMHG | HEART RATE: 87 BPM | RESPIRATION RATE: 16 BRPM | TEMPERATURE: 98 F
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
## 2022-07-01 LAB
ALBUMIN SERPL ELPH-MCNC: 5.3 G/DL — HIGH (ref 3.5–5.2)
ALP SERPL-CCNC: 57 U/L — SIGNIFICANT CHANGE UP (ref 30–115)
ALT FLD-CCNC: 25 U/L — SIGNIFICANT CHANGE UP (ref 0–41)
ANION GAP SERPL CALC-SCNC: 15 MMOL/L — HIGH (ref 7–14)
AST SERPL-CCNC: 43 U/L — HIGH (ref 0–41)
BASOPHILS # BLD AUTO: 0.03 K/UL — SIGNIFICANT CHANGE UP (ref 0–0.2)
BASOPHILS NFR BLD AUTO: 0.6 % — SIGNIFICANT CHANGE UP (ref 0–1)
BILIRUB SERPL-MCNC: 0.6 MG/DL — SIGNIFICANT CHANGE UP (ref 0.2–1.2)
BUN SERPL-MCNC: 13 MG/DL — SIGNIFICANT CHANGE UP (ref 10–20)
CALCIUM SERPL-MCNC: 10 MG/DL — SIGNIFICANT CHANGE UP (ref 8.5–10.1)
CHLORIDE SERPL-SCNC: 101 MMOL/L — SIGNIFICANT CHANGE UP (ref 98–110)
CO2 SERPL-SCNC: 27 MMOL/L — SIGNIFICANT CHANGE UP (ref 17–32)
CREAT SERPL-MCNC: 1 MG/DL — SIGNIFICANT CHANGE UP (ref 0.7–1.5)
EGFR: 101 ML/MIN/1.73M2 — SIGNIFICANT CHANGE UP
EOSINOPHIL # BLD AUTO: 0.06 K/UL — SIGNIFICANT CHANGE UP (ref 0–0.7)
EOSINOPHIL NFR BLD AUTO: 1.2 % — SIGNIFICANT CHANGE UP (ref 0–8)
GLUCOSE SERPL-MCNC: 113 MG/DL — HIGH (ref 70–99)
HCT VFR BLD CALC: 45.5 % — SIGNIFICANT CHANGE UP (ref 42–52)
HGB BLD-MCNC: 16.1 G/DL — SIGNIFICANT CHANGE UP (ref 14–18)
IMM GRANULOCYTES NFR BLD AUTO: 0.2 % — SIGNIFICANT CHANGE UP (ref 0.1–0.3)
LACTATE SERPL-SCNC: 1.3 MMOL/L — SIGNIFICANT CHANGE UP (ref 0.7–2)
LYMPHOCYTES # BLD AUTO: 2.5 K/UL — SIGNIFICANT CHANGE UP (ref 1.2–3.4)
LYMPHOCYTES # BLD AUTO: 52 % — HIGH (ref 20.5–51.1)
MCHC RBC-ENTMCNC: 33.8 PG — HIGH (ref 27–31)
MCHC RBC-ENTMCNC: 35.4 G/DL — SIGNIFICANT CHANGE UP (ref 32–37)
MCV RBC AUTO: 95.4 FL — HIGH (ref 80–94)
MONOCYTES # BLD AUTO: 0.53 K/UL — SIGNIFICANT CHANGE UP (ref 0.1–0.6)
MONOCYTES NFR BLD AUTO: 11 % — HIGH (ref 1.7–9.3)
NEUTROPHILS # BLD AUTO: 1.68 K/UL — SIGNIFICANT CHANGE UP (ref 1.4–6.5)
NEUTROPHILS NFR BLD AUTO: 35 % — LOW (ref 42.2–75.2)
NRBC # BLD: 0 /100 WBCS — SIGNIFICANT CHANGE UP (ref 0–0)
PLATELET # BLD AUTO: 213 K/UL — SIGNIFICANT CHANGE UP (ref 130–400)
POTASSIUM SERPL-MCNC: 4.2 MMOL/L — SIGNIFICANT CHANGE UP (ref 3.5–5)
POTASSIUM SERPL-SCNC: 4.2 MMOL/L — SIGNIFICANT CHANGE UP (ref 3.5–5)
PROT SERPL-MCNC: 8.7 G/DL — HIGH (ref 6–8)
RBC # BLD: 4.77 M/UL — SIGNIFICANT CHANGE UP (ref 4.7–6.1)
RBC # FLD: 13.2 % — SIGNIFICANT CHANGE UP (ref 11.5–14.5)
SARS-COV-2 RNA SPEC QL NAA+PROBE: SIGNIFICANT CHANGE UP
SODIUM SERPL-SCNC: 143 MMOL/L — SIGNIFICANT CHANGE UP (ref 135–146)
WBC # BLD: 4.81 K/UL — SIGNIFICANT CHANGE UP (ref 4.8–10.8)
WBC # FLD AUTO: 4.81 K/UL — SIGNIFICANT CHANGE UP (ref 4.8–10.8)

## 2022-07-01 PROCEDURE — 99291 CRITICAL CARE FIRST HOUR: CPT

## 2022-07-01 PROCEDURE — 31575 DIAGNOSTIC LARYNGOSCOPY: CPT

## 2022-07-01 PROCEDURE — 70491 CT SOFT TISSUE NECK W/DYE: CPT | Mod: 26,MA

## 2022-07-01 PROCEDURE — 93010 ELECTROCARDIOGRAM REPORT: CPT

## 2022-07-01 PROCEDURE — 99222 1ST HOSP IP/OBS MODERATE 55: CPT

## 2022-07-01 PROCEDURE — 99223 1ST HOSP IP/OBS HIGH 75: CPT | Mod: 25

## 2022-07-01 PROCEDURE — 71045 X-RAY EXAM CHEST 1 VIEW: CPT | Mod: 26

## 2022-07-01 RX ORDER — DEXAMETHASONE 0.5 MG/5ML
10 ELIXIR ORAL ONCE
Refills: 0 | Status: COMPLETED | OUTPATIENT
Start: 2022-07-01 | End: 2022-07-01

## 2022-07-01 RX ORDER — DIPHENHYDRAMINE HCL 50 MG
50 CAPSULE ORAL ONCE
Refills: 0 | Status: COMPLETED | OUTPATIENT
Start: 2022-07-01 | End: 2022-07-01

## 2022-07-01 RX ORDER — EPINEPHRINE 0.3 MG/.3ML
0.3 INJECTION INTRAMUSCULAR; SUBCUTANEOUS ONCE
Refills: 0 | Status: COMPLETED | OUTPATIENT
Start: 2022-07-01 | End: 2022-07-01

## 2022-07-01 RX ORDER — DEXAMETHASONE 0.5 MG/5ML
8 ELIXIR ORAL EVERY 6 HOURS
Refills: 0 | Status: DISCONTINUED | OUTPATIENT
Start: 2022-07-01 | End: 2022-07-01

## 2022-07-01 RX ORDER — CHLORHEXIDINE GLUCONATE 213 G/1000ML
1 SOLUTION TOPICAL
Refills: 0 | Status: DISCONTINUED | OUTPATIENT
Start: 2022-07-01 | End: 2022-07-02

## 2022-07-01 RX ORDER — SODIUM CHLORIDE 9 MG/ML
1000 INJECTION INTRAMUSCULAR; INTRAVENOUS; SUBCUTANEOUS ONCE
Refills: 0 | Status: COMPLETED | OUTPATIENT
Start: 2022-07-01 | End: 2022-07-01

## 2022-07-01 RX ORDER — ADALIMUMAB 40MG/0.8ML
0 KIT SUBCUTANEOUS
Qty: 0 | Refills: 0 | DISCHARGE

## 2022-07-01 RX ORDER — FAMOTIDINE 10 MG/ML
20 INJECTION INTRAVENOUS ONCE
Refills: 0 | Status: COMPLETED | OUTPATIENT
Start: 2022-07-01 | End: 2022-07-01

## 2022-07-01 RX ORDER — DEXAMETHASONE 0.5 MG/5ML
8 ELIXIR ORAL EVERY 6 HOURS
Refills: 0 | Status: DISCONTINUED | OUTPATIENT
Start: 2022-07-01 | End: 2022-07-02

## 2022-07-01 RX ADMIN — SODIUM CHLORIDE 1000 MILLILITER(S): 9 INJECTION INTRAMUSCULAR; INTRAVENOUS; SUBCUTANEOUS at 22:34

## 2022-07-01 RX ADMIN — FAMOTIDINE 20 MILLIGRAM(S): 10 INJECTION INTRAVENOUS at 10:59

## 2022-07-01 RX ADMIN — Medication 50 MILLIGRAM(S): at 10:57

## 2022-07-01 RX ADMIN — Medication 102 MILLIGRAM(S): at 10:59

## 2022-07-01 RX ADMIN — SODIUM CHLORIDE 1000 MILLILITER(S): 9 INJECTION INTRAMUSCULAR; INTRAVENOUS; SUBCUTANEOUS at 11:17

## 2022-07-01 RX ADMIN — Medication 8 MILLIGRAM(S): at 19:51

## 2022-07-01 RX ADMIN — EPINEPHRINE 0.3 MILLIGRAM(S): 0.3 INJECTION INTRAMUSCULAR; SUBCUTANEOUS at 11:16

## 2022-07-01 NOTE — ED PROVIDER NOTE - PROGRESS NOTE DETAILS
MM: ICU Consulted, at bedside evaluating patient. ER: received pt on signout.  ent and anesthesia aware. pt to be admitted to the icu.

## 2022-07-01 NOTE — H&P ADULT - NSHPPHYSICALEXAM_GEN_ALL_CORE
T(C): 36.6 (07-01-22 @ 09:19), Max: 36.6 (07-01-22 @ 09:19)  HR: 87 (07-01-22 @ 09:19) (87 - 87)  BP: 135/93 (07-01-22 @ 09:19) (135/93 - 135/93)  RR: 16 (07-01-22 @ 09:19) (16 - 16)  SpO2: 97% (07-01-22 @ 09:19) (97% - 97%)    CONSTITUTIONAL: Well groomed, no apparent distress    EYES: PERRLA and symmetric, EOMI, No conjunctival or scleral injection, non-icteric    ENMT: Oral mucosa with moist membranes. lower lip edema, no gross hearing impairment noted.    NECK: Supple, symmetric and without tracheal deviation; thyroid gland not enlarged and without palpable masses, left mandibular edema,     RESPIRATORY: No respiratory distress, no use of accessory muscles; CTA b/l, no wheezes, rales or rhonchi, no dullness or hyperresonance to percussion,     CARDIOVASCULAR: RRRR, +S1S2, no murmurs, no rubs, no gallops; no JVD; no peripheral edema    Vascular: no carotid bruits; no abdominal bruit appreciated; carotid pulse palpable, radial pulse palpable, femoral pulse palpable, posterior tibialis pulse palpable    GASTROINTESTINAL: Soft, non tender, distended, no rebound, no guarding, tympanic; No palpable masses; no hepatosplenomegaly; no hernia palpated;  	  LYMPHATIC: No cervical LAD or tenderness; no axillary LAD or tenderness;    MUSCULOSKELETAL: Normal gait and station; no digital clubbing or cyanosis; normal range of motion without pain, no spinal tenderness, normal muscle strength/tone    SKIN: No rashes or ulcers noted; no subcutaneous nodules or induration palpable    NEUROLOGIC: sensation intact in upper and lower extremities b/l to light touch; moves all extremities against resistance    PSYCHIATRIC: Appropriate insight/judgment; A+O x 3, mildly agitated mood w/ affect appropriate, recent/remote memory intact

## 2022-07-01 NOTE — ED PROVIDER NOTE - OBJECTIVE STATEMENT
Patient is a 36y M pmhx of RA on Humira presenting with L sided neck swelling that started as L lower lip swelling, and some mild difficulty swallowing. Denies tongue swelling, voice changes, difficulty breathing, shortness of breath Patient is a 36y M pmhx of RA on Humira presenting with L sided neck swelling that started as L lower lip swelling, and some mild difficulty swallowing   Denies tongue swelling, voice changes, difficulty breathing, shortness of breath

## 2022-07-01 NOTE — ED PROVIDER NOTE - ATTENDING CONTRIBUTION TO CARE
35 yo m with pmh of RA on [facility], presents with c/o L neck swelling.  pt also admits lower lip swelling yesterday.  pt says he "sometimes gets swelling all over the body".  pt denies sob.  no rash.  no wheezing.  mild difficulty swallowing.  denies fever chills, recent illness.  exam: nad, ncat, perrl, eomi, mmm, L anterior neck with indurated mass, ttp, no erythema, edematous uvula, no tongue swelling or elevation, no stridor, rrr, ctab, abd soft, nt, nd aox3, imp: pt with L neck swelling and difficulty swallowing, will check labs, dex, benadryl, pepcid, and ct soft tissue neck

## 2022-07-01 NOTE — CONSULT NOTE ADULT - SUBJECTIVE AND OBJECTIVE BOX
Patient is a 36y old  Male who presents with a chief complaint of     HPI:  36y M pmhx of RA on Humira presenting with L sided neck swelling that started as L lower lip swelling, and some mild difficulty swallowing. Denies tongue swelling, voice changes, difficulty breathing, shortness of breath    Critical Care HPI  Critical care was consulted for anaphylaxis. This is a 36y M pmhx of RA on Humira presenting with L sided neck swelling that started as L lower lip swelling, and some mild difficulty swallowing. Denies tongue swelling, voice changes, difficulty breathing, shortness of breath.  When pateint first presented to ER, the patient had difficulty speaking. CT neck showed occlusion of posterior pharynx. ER gave steroids, epi, pepcid, and benadryl. ENT evaluated patient, determined to have tonsillar swelling and no need for intubation. Anesthesia was at bedside as well.   Currently patient is able to speak in full sentences and looks comfortable.       PAST MEDICAL & SURGICAL HISTORY:  No pertinent past medical history      Rheumatoid arthritis      No significant past surgical history          SOCIAL HX:   Smoking    denies cigarette smoking, active marijuana smoker, 1 blunt per day                     ETOH   active heavy alcohol consumption roughly 1/2 pint of whiskey per day                         Other marijuana, ambulates independently, denies any travel in past 6 months, currently works as     FAMILY HISTORY:  Family history unknown  patient reports no known history of major family disease. Especially no joint disease/auto-immune disease.    :  No known cardiovascular family history     Review Of Systems:     All ROS are negative except per HPI       Allergies    Motrin (Unknown)  naproxen (Anaphylaxis)    Intolerances          PHYSICAL EXAM    ICU Vital Signs Last 24 Hrs  T(C): 36.6 (01 Jul 2022 09:19), Max: 36.6 (01 Jul 2022 09:19)  T(F): 97.8 (01 Jul 2022 09:19), Max: 97.8 (01 Jul 2022 09:19)  HR: 87 (01 Jul 2022 09:19) (87 - 87)  BP: 135/93 (01 Jul 2022 09:19) (135/93 - 135/93)  RR: 16 (01 Jul 2022 09:19) (16 - 16)  SpO2: 97% (01 Jul 2022 09:19) (97% - 97%)      CONSTITUTIONAL:  Ill-appearing. NAD    ENT:   Airway patent,   Mouth with normal mucosa.   No thrush    EYES:   pupils equal,   round and reactive to light.    CARDIAC:   Normal rate,   Regular rhythm.    Heart sounds S1, S2.   No edema    RESPIRATORY:   Inspection- normal chest wall symmetry  Palpation- normal chest wall expansion  Auscultation- clear to auscultation bilaterally     GASTROINTESTINAL:  Abdomen soft   Non-tender,   No guarding,   + BS    MUSCULOSKELETAL:   Range of motion is not limited,  No clubbing, cyanosis    NEUROLOGICAL:   AOx4  Follows commands  Moves all extremities     SKIN:   Skin normal color for race,   Warm and dry  No evidence of rash.    PSYCHIATRIC:   No apparent risk to self or others.        LABS:                          16.1   4.81  )-----------( 213      ( 01 Jul 2022 09:10 )             45.5                                               07-01    143  |  101  |  13  ----------------------------<  113<H>  4.2   |  27  |  1.0    Ca    10.0      01 Jul 2022 09:10    TPro  8.7<H>  /  Alb  5.3<H>  /  TBili  0.6  /  DBili  x   /  AST  43<H>  /  ALT  25  /  AlkPhos  57  07-01                                                                                           LIVER FUNCTIONS - ( 01 Jul 2022 09:10 )  Alb: 5.3 g/dL / Pro: 8.7 g/dL / ALK PHOS: 57 U/L / ALT: 25 U/L / AST: 43 U/L / GGT: x                                                                                                                                       X-Rays reviewed:                                                                                    ECHO not done    CXR interpreted by me: no radiographic evidence of acute cardiopulmonary disease     MEDICATIONS  (STANDING):    MEDICATIONS  (PRN):

## 2022-07-01 NOTE — ED ADULT NURSE NOTE - OBJECTIVE STATEMENT
Pt c/o b/l throat pain and swelling b/l to neck L>R.  Pt denies any recent illness, patient sexually active but with on partner. Pt denies allergies. Pt reports difficulty breathing and only able to swallow small sips of water. Pt speaking clearly and with no noted difficulty breathing. Ambulated into ED

## 2022-07-01 NOTE — ED PROVIDER NOTE - NS ED ROS FT
Review of Systems    Constitutional: (-) fever (+) mild difficulty swallowing  Cardiovascular: (-) chest pain, (-) syncope  Respiratory: (-) cough, (-) shortness of breath  Gastrointestinal: (-) vomiting, (-) diarrhea, (-) abdominal pain  Musculoskeletal: (-) neck pain, (-) back pain, (-) joint pain  Integumentary: (-) rash, (+) L neck edema  Neurological: (-) headache, (-) altered mental status    Except as documented in the HPI, all other systems are negative. Review of Systems    Constitutional: (-) fever (+) mild difficulty swallowing  ENT: Throat swelling  Cardiovascular: (-) chest pain, (-) syncope  Respiratory: (-) cough, (-) shortness of breath  Gastrointestinal: (-) vomiting, (-) diarrhea, (-) abdominal pain  Musculoskeletal: (-) neck pain, (-) back pain, (-) joint pain  Integumentary: (-) rash, (+) L neck edema  Neurological: (-) headache, (-) altered mental status    Except as documented in the HPI, all other systems are negative.

## 2022-07-01 NOTE — H&P ADULT - HISTORY OF PRESENT ILLNESS
35yo man w/ PMHx EtOH misuse, RA on Humira, drove himself to the ED w/ difficulty breathing 2/2 swelling of the neck and soft tissues of the throat.    HPI  For over a year pt has had intermittent swelling events as often as twice a week of isolated tissue that spreads to surrounding tissue, and usually self resolves after a few days. The swelling is nontender, nonpruritic, nonerythematous, euthermic. The events are not The pt has presented to the ED in the past (6/21) for same issue and responded well to prednisone/benadryl.     Pt has seen a PCP about this in the past, he has prescription for prednisone 20 and he took one dose before coming in.     Pt denies CP, sob, n/v/d; night sweats, fevers, wt changes; denies urgency or burning, denies SOB; denies cough, sob; denies muscle or joint pain; denies SI/HI;     ED Course:  On arrival to /59 80bpm 18 breaths, 99%RA    Pt presented w/ submandibular swelling, breathing in short word groups, dyspnic. He was given benadryl, decadron, and Pepcid.  pt swelling was not improving so he was given 1 dose of epinephrin and taken to CT. CT showed 90% occlusion of airway.  Pt was eval'd by ENT who visualized the oropharynx and described soft tissue swelling but no evidence of laryngal edema.   Pt was eval'd by anasthesia for intubation iso airway protection however swelling had begun to subside and they rec'd against intubation at that time.     Pt was eval'd by critical care fellow and attending and admitted to ICU for further management and observation.     Social:  - Pt w/ significant daily EtOH, occasional smoker, daily marijuana use  - sexually active with inconsistent protection  - no pets or travel or sick contacts

## 2022-07-01 NOTE — CONSULT NOTE ADULT - ASSESSMENT
Pt is a 35yo Male who presents with oral cavity swelling since last night - called by ED to assess airway. Pt with angioedema vs allergic reaction vs viral illness causing ++ edema to the soft palate/uvula. Laryngeal and tracheal airway patent.     ·	Decadron 10mg given in ED, then continue 6mg q8h x3 doses, then reassess as needed  ·	cont H1/H2 block  ·	no discernable collection on CT scan, unlikely bacterial infection, no surgical intervention  ·	send complements to r/o HAE  ·	HOB elevated 30-45*  ·	pt managing secretions and airway currently, but low thresh hold for intubation  ·	anesthesia at bedside currently -> will hold off on intubation for now  ·	d/w ED staff  ·	w/d with attng, will follow

## 2022-07-01 NOTE — CONSULT NOTE ADULT - ASSESSMENT
IMPRESSION:  Anaphylaxis  HO RA, on humira at home  Active marijuana smoker, 1 blunt per day for 10yrs  ETOH Abuse, 1/2 pint of whiskey per day    PLAN:    CNS: Avoid CNS depressants.     HEENT: Oral care. ENT FU. Dexa 8q6h for now. Check tryptase, C4, and C1 esterase levels.     PULMONARY:  HOB @ 45 degrees.  Aspiration precautions. Target SpO2 92-96%. Needs close airway monitoring.     CARDIOVASCULAR: Avoid volume overload. ECHO.     GI: GI prophylaxis. Speech and swallow eval.    RENAL:  Follow up lytes.  Correct as needed.     INFECTIOUS DISEASE: Follow up cultures. Procal. No abx for now.     HEMATOLOGICAL:  DVT prophylaxis.    RHEUMATOLOGY: Continue with home humira dose    ENDOCRINE:  Follow up FS.     MUSCULOSKELETAL: Increase ambulation as tolerated.    MICU IMPRESSION:  Angioedema  Anaphylaxis?  HO RA, on humira at home  Active marijuana smoker, 1 blunt per day for 10yrs  ETOH Abuse, 1/2 pint of whiskey per day    PLAN:    CNS: Avoid CNS depressants.     HEENT: Oral care. ENT FU. Dexamethasone 8q6h for now. Check tryptase, C4, and C1 esterase levels.     PULMONARY:  HOB @ 45 degrees.  Aspiration precautions. Target SpO2 92-96%. Needs close airway monitoring.     CARDIOVASCULAR: Avoid volume overload. ECHO.     GI: GI prophylaxis. Speech and swallow eval.    RENAL:  Follow up lytes.  Correct as needed.     INFECTIOUS DISEASE: Follow up cultures. Procal. No abx for now.     HEMATOLOGICAL:  DVT prophylaxis.    RHEUMATOLOGY: Continue with home humira dose    ENDOCRINE:  Follow up FS.     MUSCULOSKELETAL: Increase ambulation as tolerated.    MICU monitoring.  IMPRESSION:  Angioedema  Anaphylaxis?  HO RA, on humira at home  Active marijuana smoker, 1 blunt per day for 10yrs  ETOH Abuse, 1/2 pint of whiskey per day    PLAN:    CNS: Avoid CNS depressants.     HEENT: Oral care. ENT FU. Dexamethasone 8q6h for now; taper down per ENT. Check tryptase, C4, and C1 esterase levels.     PULMONARY:  HOB @ 45 degrees.  On room air O2 sat is 99%.     CARDIOVASCULAR: Avoid volume overload.     GI: GI prophylaxis. Speech and swallow eval.    RENAL:  Follow up lytes.  Correct as needed.     INFECTIOUS DISEASE: No indications for abx at present.     HEMATOLOGICAL:  DVT prophylaxis. Early ambulation.     ENDOCRINE:  Follow up FS.     MUSCULOSKELETAL: Out of bed.    MICU monitoring.

## 2022-07-01 NOTE — H&P ADULT - NSHPLABSRESULTS_GEN_ALL_CORE
LABS:                        16.1   4.81  )-----------( 213      ( 01 Jul 2022 09:10 )             45.5     07-01    143  |  101  |  13  ----------------------------<  113<H>  4.2   |  27  |  1.0    Ca    10.0      01 Jul 2022 09:10    TPro  8.7<H>  /  Alb  5.3<H>  /  TBili  0.6  /  DBili  x   /  AST  43<H>  /  ALT  25  /  AlkPhos  57  07-01          Lactate, Blood: 1.3 mmol/L (07-01-22 @ 09:10)

## 2022-07-01 NOTE — ED PROVIDER NOTE - ATTENDING APP SHARED VISIT CONTRIBUTION OF CARE
37 yo m with pmh of RA on [facility], presents with c/o L neck swelling.  pt also admits lower lip swelling yesterday.  pt says he "sometimes gets swelling all over the body".  pt denies sob.  no rash.  no wheezing.  mild difficulty swallowing.  denies fever chills, recent illness.  exam: nad, ncat, perrl, eomi, mmm, L anterior neck with indurated mass, ttp, no erythema, edematous uvula, no tongue rrr, ctab, abd soft, nt, nd aox3, no calf tenderness, no pitting edema

## 2022-07-01 NOTE — CONSULT NOTE ADULT - ATTENDING COMMENTS
IMPRESSION:  Angioedema  Anaphylaxis?  JOSE RA, on humira at home  Active marijuana smoker, 1 blunt per day for 10yrs  ETOH Abuse, 1/2 pint of whiskey per day    Impression and plan are my own.

## 2022-07-01 NOTE — ED PROVIDER NOTE - PHYSICAL EXAMINATION
PHYSICAL EXAM: I have reviewed current vital signs.  GENERAL: NAD, well-nourished; well-developed.  HEAD:  Normocephalic, atraumatic.  EYES: EOMI, PERRL, conjunctiva and sclera clear.  ENT: MMM. able to partially visualize posterior pharynx on exam.  patient drinking water initially in triage.  NECK: Supple, no JVD.  CHEST/LUNG: Clear to auscultation bilaterally; no wheezes, rales, or rhonchi.  HEART: Regular rate and rhythm, normal S1 and S2; no murmurs, rubs, or gallops.  ABDOMEN: Soft, nontender, nondistended.  EXTREMITIES:  2+ peripheral pulses; no clubbing, cyanosis, or edema.  PSYCH: Cooperative, appropriate, normal mood and affect.  NEUROLOGY: A&O x 3. Motor 5/5. Sensory intact. No focal neurological deficits. CN II - XII intact. (-) dysmetria, facial droop, pronator drift.  SKIN: Warm and dry.

## 2022-07-01 NOTE — ED PROVIDER NOTE - NS ED ATTENDING STATEMENT MOD
This was a shared visit with the TRACEY. I reviewed and verified the documentation and independently performed the documented: I have personally provided the amount of critical care time documented below concurrently with the resident/fellow.  This time excludes time spent on separate procedures and time spent teaching. I have reviewed the resident’s / fellow’s documentation and I agree with the history, exam, and assessment and plan of care.

## 2022-07-01 NOTE — ED ADULT TRIAGE NOTE - CHIEF COMPLAINT QUOTE
Throat swelling since this morning. Pt denies allergies. Hoarse voice noted. Pt reports difficulty breathing and only able to swallow small sips of water.

## 2022-07-01 NOTE — CONSULT NOTE ADULT - SUBJECTIVE AND OBJECTIVE BOX
Pt is a 35yo Male who presents with oral cavity swelling since last night - called by ED to assess airway. PT seen and examined at bedside. PT states he had some lower lip edema earlier this week, took some Benadryl with resolution of symptoms. PT then developed swelling in the back of his throat this AM, had difficulty breathing so presented to ED. PT given Decadron, Benadryl & Pepcid with some relief in ED, states he can speak now. PT able to manage his secretions, denies SOB currently. PT has h/o intermittent edema on his face, no clear history of allergies. Has never seen an allergist. Denies any new food, detergents, soaps.    PAST MEDICAL & SURGICAL HISTORY:  No pertinent past medical history  Rheumatoid arthritis      No significant past surgical history    MEDICATIONS  (STANDING):  No home meds    Allergies    Motrin (Unknown)  naproxen (Anaphylaxis)    Intolerances      FAMILY HISTORY:  Family history unknown  patient reports no known history of major family disease. Especially no joint disease/auto-immune disease.      REVIEW OF SYSTEMS   [x] A ten-point review of systems was otherwise negative except as noted.    Vital Signs Last 24 Hrs  T(C): 36.6 (01 Jul 2022 09:19), Max: 36.6 (01 Jul 2022 09:19)  T(F): 97.8 (01 Jul 2022 09:19), Max: 97.8 (01 Jul 2022 09:19)  HR: 87 (01 Jul 2022 09:19) (87 - 87)  BP: 135/93 (01 Jul 2022 09:19) (135/93 - 135/93)  RR: 16 (01 Jul 2022 09:19) (16 - 16)  SpO2: 97% (01 Jul 2022 09:19) (97% - 97%)    GEN: NAD, awake and alert. No drooling or pooling of secretions. No stridor or stertor. Muffled vocal quality, no hoarseness.   SKIN: Good color, non diaphoretic.  HEENT: Oral mucosa pink and moist. + mild lower lip edema. ++ significant edema to b/l palatine tonsils and uvula, palatoglossal arch. No erythema or edema noted to buccal mucosa, tongue or FOM. Uvula midline.   NECK: Traceha midline, Neck supple, no TTP to B/L lateral neck, no cervical LAD.  RESP: No dyspnea, non-labored breathing. No use of accessory muscles.   CARDIO: +S1/S2  ABDO: Soft, NT.  EXT: MCKEON x 4    Fiberoptic Laryngoscopy: No masses or lesions noted to NP/OP/HP. ++ OP edema, specifically tonsil & uvula noted. Laryngeal structures intact, no edema or erythema noted. mild epiglottic edema. TVC/FVC mobile and intact, no glottic gap noted. laryngeal and tracheal airway patent.    LABS:                        16.1   4.81  )-----------( 213      ( 01 Jul 2022 09:10 )             45.5     07-01    143  |  101  |  13  ----------------------------<  113<H>  4.2   |  27  |  1.0    Ca    10.0      01 Jul 2022 09:10    TPro  8.7<H>  /  Alb  5.3<H>  /  TBili  0.6  /  DBili  x   /  AST  43<H>  /  ALT  25  /  AlkPhos  57  07-01    < from: CT Neck Soft Tissue w/ IV Cont (07.01.22 @ 10:25) >    ACC: [id number] EXAM:  CT NECK SOFT TISSUE IC                          PROCEDURE DATE:  07/01/2022          INTERPRETATION:  Clinical history: Neck swelling    Technique:  Multidetector axial CT images of the neck were obtained   following the intravenous administration of 100 cc of nonionic contrast.   Images were reformatted on multiple planes and reviewed in bone and   soft-tissue windows.    Comparison: None.    Findings:  The examination is degraded by moderate motion artifact at the level of   the mandible.    Aerodigestive structures: There is marked edematous change and swelling   of the soft palate, oropharynx, and retropharyngeal soft tissue resulting   in moderate-severe stenosis of the airway. The appearance of the stenosis   is exaggerated by motion artifact. No drainable fluid collection.    Thyroid gland: Normal.  Parotid and submandibular glands:  Normal.    Lymph nodes: No pathologic adenopathy by imaging size criteria.    Vascular structures: Normal.    Osseous structures: No fracture, dislocation or destructive lesion.    Paranasal sinuses: Clear.  Mastoid air cells:  Clear.    Partially visualized intracranial structures: Normal.    Partially visualized lung apices: Normal.      Impression:  Motion degraded examination.    Marked edema and swelling involving the soft palate, oropharynx, and   retropharyngeal soft tissue resulting in moderate-severe stenosis of the   airway. The finding may reflect allergic etiologies such as angioedema,   or infectious/inflammatory etiologies. No drainable fluid collection.    Communication: The summary of above findings were discussed with readback   confirmation with Dr. Tariq  by radiologist Dr. Reyes on 7/1/2022 at   11:00 AM.    --- End of Report ---      GRANT REYES MD; Attending Radiologist  This document has been electronically signed. Jul 1 2022 11:17AM

## 2022-07-01 NOTE — H&P ADULT - ASSESSMENT
IMPRESSION:    Angioedema v. doubt Anaphylaxis?  HO RA, on humira at home  Active marijuana smoker, 1 blunt per day for 10yrs  ETOH Abuse, 1/2 pint of whiskey per day    PLAN:    Respiratory:    - HOB @ 45 degrees w/ Aspiration precautions; Target SpO2 92-96%  - Needs close airway monitoring    INFECTIOUS DISEASE:   - Follow up cultures, Procal  - observe off ABx    CARDIOVASCULAR:   - Avoid volume overload   - TTE    CNS   - Avoid CNS depressants.     HEMATOLOGICAL  - DVT prophylaxis - pt ambulates, young, no dvt risk    HEENT:   - Oral care  - f/u ENT  - Dexamethasone 8q6h for now. Check tryptase, C4, and C1 esterase levels.     RENAL:    - Follow up lytes.  Correct as needed.     GI:   - GI prophylaxis. Speech and swallow eval.    RHEUMATOLOGY:   - Continue with home humira dose    ENDOCRINE:    - Follow up FS.     MUSCULOSKELETAL:   - Increase ambulation as tolerated.    Dispo: MICU monitoring.

## 2022-07-01 NOTE — ED PROVIDER NOTE - CLINICAL SUMMARY MEDICAL DECISION MAKING FREE TEXT BOX
pt was a signout from Dr. Singleton. 36 yr old m that presents with throat swelling. labs, imaging obtained. pt given steroids, epi. pt admitted to micu

## 2022-07-01 NOTE — CONSULT NOTE ADULT - NS ATTEND AMEND GEN_ALL_CORE FT
Patient seen and examined at bedside.    I reviewed and interpreted videoeendoscopy showing no airway compromise.    Continue steroids, antihistamines, airway monitoring.  Allergy follow up as outpatient.

## 2022-07-02 ENCOUNTER — TRANSCRIPTION ENCOUNTER (OUTPATIENT)
Age: 37
End: 2022-07-02

## 2022-07-02 VITALS
HEART RATE: 67 BPM | RESPIRATION RATE: 16 BRPM | TEMPERATURE: 98 F | OXYGEN SATURATION: 99 % | SYSTOLIC BLOOD PRESSURE: 137 MMHG | DIASTOLIC BLOOD PRESSURE: 86 MMHG

## 2022-07-02 LAB
ANION GAP SERPL CALC-SCNC: 16 MMOL/L — HIGH (ref 7–14)
BASOPHILS # BLD AUTO: 0.01 K/UL — SIGNIFICANT CHANGE UP (ref 0–0.2)
BASOPHILS NFR BLD AUTO: 0.2 % — SIGNIFICANT CHANGE UP (ref 0–1)
BUN SERPL-MCNC: 9 MG/DL — LOW (ref 10–20)
CALCIUM SERPL-MCNC: 10.1 MG/DL — SIGNIFICANT CHANGE UP (ref 8.5–10.1)
CHLORIDE SERPL-SCNC: 96 MMOL/L — LOW (ref 98–110)
CO2 SERPL-SCNC: 24 MMOL/L — SIGNIFICANT CHANGE UP (ref 17–32)
CREAT SERPL-MCNC: 0.8 MG/DL — SIGNIFICANT CHANGE UP (ref 0.7–1.5)
EGFR: 118 ML/MIN/1.73M2 — SIGNIFICANT CHANGE UP
EOSINOPHIL # BLD AUTO: 0 K/UL — SIGNIFICANT CHANGE UP (ref 0–0.7)
EOSINOPHIL NFR BLD AUTO: 0 % — SIGNIFICANT CHANGE UP (ref 0–8)
GLUCOSE SERPL-MCNC: 125 MG/DL — HIGH (ref 70–99)
HCT VFR BLD CALC: 44.2 % — SIGNIFICANT CHANGE UP (ref 42–52)
HGB BLD-MCNC: 15.4 G/DL — SIGNIFICANT CHANGE UP (ref 14–18)
IMM GRANULOCYTES NFR BLD AUTO: 0.5 % — HIGH (ref 0.1–0.3)
LYMPHOCYTES # BLD AUTO: 1.31 K/UL — SIGNIFICANT CHANGE UP (ref 1.2–3.4)
LYMPHOCYTES # BLD AUTO: 22.8 % — SIGNIFICANT CHANGE UP (ref 20.5–51.1)
MAGNESIUM SERPL-MCNC: 2.2 MG/DL — SIGNIFICANT CHANGE UP (ref 1.8–2.4)
MCHC RBC-ENTMCNC: 32.6 PG — HIGH (ref 27–31)
MCHC RBC-ENTMCNC: 34.8 G/DL — SIGNIFICANT CHANGE UP (ref 32–37)
MCV RBC AUTO: 93.6 FL — SIGNIFICANT CHANGE UP (ref 80–94)
MONOCYTES # BLD AUTO: 0.18 K/UL — SIGNIFICANT CHANGE UP (ref 0.1–0.6)
MONOCYTES NFR BLD AUTO: 3.1 % — SIGNIFICANT CHANGE UP (ref 1.7–9.3)
NEUTROPHILS # BLD AUTO: 4.22 K/UL — SIGNIFICANT CHANGE UP (ref 1.4–6.5)
NEUTROPHILS NFR BLD AUTO: 73.4 % — SIGNIFICANT CHANGE UP (ref 42.2–75.2)
NRBC # BLD: 0 /100 WBCS — SIGNIFICANT CHANGE UP (ref 0–0)
PLATELET # BLD AUTO: 194 K/UL — SIGNIFICANT CHANGE UP (ref 130–400)
POTASSIUM SERPL-MCNC: 4.6 MMOL/L — SIGNIFICANT CHANGE UP (ref 3.5–5)
POTASSIUM SERPL-SCNC: 4.6 MMOL/L — SIGNIFICANT CHANGE UP (ref 3.5–5)
RBC # BLD: 4.72 M/UL — SIGNIFICANT CHANGE UP (ref 4.7–6.1)
RBC # FLD: 12.7 % — SIGNIFICANT CHANGE UP (ref 11.5–14.5)
SODIUM SERPL-SCNC: 136 MMOL/L — SIGNIFICANT CHANGE UP (ref 135–146)
WBC # BLD: 5.75 K/UL — SIGNIFICANT CHANGE UP (ref 4.8–10.8)
WBC # FLD AUTO: 5.75 K/UL — SIGNIFICANT CHANGE UP (ref 4.8–10.8)

## 2022-07-02 PROCEDURE — 99233 SBSQ HOSP IP/OBS HIGH 50: CPT

## 2022-07-02 RX ORDER — FAMOTIDINE 10 MG/ML
20 INJECTION INTRAVENOUS
Refills: 0 | Status: DISCONTINUED | OUTPATIENT
Start: 2022-07-02 | End: 2022-07-02

## 2022-07-02 RX ORDER — PANTOPRAZOLE SODIUM 20 MG/1
40 TABLET, DELAYED RELEASE ORAL
Refills: 0 | Status: DISCONTINUED | OUTPATIENT
Start: 2022-07-02 | End: 2022-07-02

## 2022-07-02 RX ORDER — EPINEPHRINE 0.3 MG/.3ML
1 INJECTION INTRAMUSCULAR; SUBCUTANEOUS
Qty: 1 | Refills: 2
Start: 2022-07-02 | End: 2022-09-29

## 2022-07-02 RX ORDER — FAMOTIDINE 10 MG/ML
1 INJECTION INTRAVENOUS
Qty: 60 | Refills: 1
Start: 2022-07-02 | End: 2022-08-30

## 2022-07-02 RX ORDER — DEXAMETHASONE 0.5 MG/5ML
6 ELIXIR ORAL EVERY 8 HOURS
Refills: 0 | Status: DISCONTINUED | OUTPATIENT
Start: 2022-07-02 | End: 2022-07-02

## 2022-07-02 RX ADMIN — PANTOPRAZOLE SODIUM 40 MILLIGRAM(S): 20 TABLET, DELAYED RELEASE ORAL at 06:30

## 2022-07-02 RX ADMIN — Medication 8 MILLIGRAM(S): at 07:46

## 2022-07-02 RX ADMIN — CHLORHEXIDINE GLUCONATE 1 APPLICATION(S): 213 SOLUTION TOPICAL at 06:30

## 2022-07-02 RX ADMIN — Medication 8 MILLIGRAM(S): at 02:50

## 2022-07-02 RX ADMIN — Medication 6 MILLIGRAM(S): at 12:48

## 2022-07-02 NOTE — DISCHARGE NOTE NURSING/CASE MANAGEMENT/SOCIAL WORK - NSDCVIVACCINE_GEN_ALL_CORE_FT
Tdap; 18-Apr-2021 19:22; Prosper Stewart (KLAUDIA); [employer]; [id number] (Exp. Date: 18-Nov-2022); IntraMuscular; Deltoid Right.; 0.5 milliLiter(s); VIS (VIS Published: 09-May-2013, VIS Presented: 18-Apr-2021);

## 2022-07-02 NOTE — DISCHARGE NOTE PROVIDER - HOSPITAL COURSE
Pt is leaving AMA    37yo man w/ PMHx EtOH misuse, RA on Humira, drove himself to the ED w/ difficulty breathing 2/2 swelling of the neck and soft tissues of the throat.    HPI  For over a year pt has had intermittent swelling events as often as twice a week of isolated tissue that spreads to surrounding tissue, and usually self resolves after a few days. The swelling is nontender, nonpruritic, nonerythematous, euthermic. The events are not The pt has presented to the ED in the past (6/21) for same issue and responded well to prednisone/benadryl.     Pt has seen a PCP about this in the past, he has prescription for prednisone 20 and he took one dose before coming in.     Pt denies CP, sob, n/v/d; night sweats, fevers, wt changes; denies urgency or burning, denies SOB; denies cough, sob; denies muscle or joint pain; denies SI/HI;     ED Course:  On arrival to /59 80bpm 18 breaths, 99%RA    Pt presented w/ submandibular swelling, breathing in short word groups, dyspnic. He was given benadryl, decadron, and Pepcid.  pt swelling was not improving so he was given 1 dose of epinephrin and taken to CT. CT showed 90% occlusion of airway.  Pt was eval'd by ENT who visualized the oropharynx and described soft tissue swelling but no evidence of laryngal edema.   Pt was eval'd by anasthesia for intubation iso airway protection however swelling had begun to subside and they rec'd against intubation at that time.       ICU course:   Pt monitored in the ICU. Hemodynamically stable. No intubation required. Pt tolerated PO intake with no complications. Pt was advised to stay over the next 24 hrs to receive the rest of his IV steroid doses and for further monitoring but pt wants to leave AMA.

## 2022-07-02 NOTE — DISCHARGE NOTE NURSING/CASE MANAGEMENT/SOCIAL WORK - NSDCPEFALRISK_GEN_ALL_CORE
For information on Fall & Injury Prevention, visit: [email or web address] OR  [email or web address] OR  https://www.cdc.gov/steadi/patient.html

## 2022-07-02 NOTE — SWALLOW BEDSIDE ASSESSMENT ADULT - SLP GENERAL OBSERVATIONS
Pt received in bed, A&Ox4. Per RN and pt, no issues with tolerating diet today since edema has mostly resolved.

## 2022-07-02 NOTE — PROGRESS NOTE ADULT - ASSESSMENT
Pt is a 36y Male a/w oral cavity swelling. Patient clinically improved this morning. Patient with angioedema vs. allergic reaction vs. viral illness causing edema to soft palate/uvula.     Plan:   - Continue Steroids, Decadron 10 mg given in ED, 6mg 8qh for 2 more doses (1 dose given 7am)   - Continue H1/H2 blockers   - F/u C1 and C4 esterase complements to r/o HAE   - HOB elevate>30   - Diet per SLP per primary team   - Will continue to follow   - Will d/w attending 
IMPRESSION:    Angioedema improved ( prior hx 2 m ago)  HO RA, on humira at home  Active marijuana smoker, 1 blunt per day for 10yrs  ETOH Abuse, 1/2 pint of whiskey per day    PLAN:    CNS: Avoid CNS depressants.     HEENT: Oral care. ENT FU. Dexamethasone 6 q 8h for now; taper down per ENT. Check tryptase, C4, and C1 esterase levels.     PULMONARY:  HOB @ 45 degrees.  On room air O2 sat is 99%.     CARDIOVASCULAR: Avoid volume overload.     GI: GI prophylaxis. clear liquid advance as tolerated    RENAL:  Follow up lytes.  Correct as needed.     INFECTIOUS DISEASE: No indications for abx at present.     HEMATOLOGICAL:  DVT prophylaxis. Early ambulation.     ENDOCRINE:  Follow up FS.     MUSCULOSKELETAL: Out of bed.    SDU

## 2022-07-02 NOTE — SWALLOW BEDSIDE ASSESSMENT ADULT - SLP PERTINENT HISTORY OF CURRENT PROBLEM
35y/o Male presenting with swelling of the oral cavity. Seen by ENT. Angioedema vs. Viral Illness vs. allergic reaction

## 2022-07-02 NOTE — DISCHARGE NOTE NURSING/CASE MANAGEMENT/SOCIAL WORK - PATIENT PORTAL LINK FT
You can access the FollowMyHealth Patient Portal offered by [facility] by registering at the following website: [email or web address]. By joining [facility]’s FollowMyHealth portal, you will also be able to view your health information using other applications (apps) compatible with our system.

## 2022-07-02 NOTE — PROGRESS NOTE ADULT - SUBJECTIVE AND OBJECTIVE BOX
Over Night Events: events noted, feels better, on RA, ENT reviewed    PHYSICAL EXAM    ICU Vital Signs Last 24 Hrs  T(C): 36.7 (02 Jul 2022 04:00), Max: 36.9 (01 Jul 2022 17:43)  T(F): 98 (02 Jul 2022 04:00), Max: 98.4 (01 Jul 2022 17:43)  HR: 52 (02 Jul 2022 06:00) (48 - 87)  BP: 144/86 (02 Jul 2022 06:00) (100/68 - 144/86)  BP(mean): 106 (02 Jul 2022 06:00) (92 - 106)-  RR: 15 (02 Jul 2022 06:00) (13 - 27)  SpO2: 97% (02 Jul 2022 06:00) (97% - 99%)      General: ill looking  HEENT: EMELIA , mild lip swelling            Lymph Nodes: No cervical LN   Lungs: Bilateral BS  Cardiovascular: Regular   Abdomen: Soft, Positive BS  Extremities: No clubbing   Skin: Warm  Neurological: Non focal       07-01-22 @ 07:01  -  07-02-22 @ 07:00  --------------------------------------------------------  IN:    Oral Fluid: 40 mL  Total IN: 40 mL    OUT:  Total OUT: 0 mL    Total NET: 40 mL          LABS:                          15.4   5.75  )-----------( 194      ( 02 Jul 2022 05:05 )             44.2                                               07-02    136  |  96<L>  |  9<L>  ----------------------------<  125<H>  4.6   |  24  |  0.8    Ca    10.1      02 Jul 2022 05:05  Mg     2.2     07-02    TPro  8.7<H>  /  Alb  5.3<H>  /  TBili  0.6  /  DBili  x   /  AST  43<H>  /  ALT  25  /  AlkPhos  57  07-01                                                                                           LIVER FUNCTIONS - ( 01 Jul 2022 09:10 )  Alb: 5.3 g/dL / Pro: 8.7 g/dL / ALK PHOS: 57 U/L / ALT: 25 U/L / AST: 43 U/L / GGT: x                                                                                                                                       MEDICATIONS  (STANDING):  chlorhexidine 4% Liquid 1 Application(s) Topical <User Schedule>  dexAMETHasone  Injectable 8 milliGRAM(s) IV Push every 6 hours  pantoprazole    Tablet 40 milliGRAM(s) Oral before breakfast    MEDICATIONS  (PRN):    CXR reviewed    
ENT DAILY PROGRESS NOTE    Pt is a 36y Male a/w oral cavity swelling. Patient seen and examined at bedside. Patient doing well, offers no complaints. He states his symptoms have significantly improved since last night. Patient denies any triggers including new food, detergent, soaps. Patient requesting diet this morning. Denies any fever, chills, N/V, SOB/difficulty breathing. No acute overnight events       REVIEW OF SYSTEMS   [x] A ten-point review of systems was otherwise negative except as noted.  [ ] Due to altered mental status/intubation, subjective information were not able to be obtained from patient. History was obtained, to the extent possible, from review of the chart and collateral sources of information.    Allergies    Motrin (Unknown)  naproxen (Anaphylaxis)    Intolerances        MEDICATIONS:  chlorhexidine 4% Liquid 1 Application(s) Topical <User Schedule>  dexAMETHasone  Injectable 8 milliGRAM(s) IV Push every 6 hours  pantoprazole    Tablet 40 milliGRAM(s) Oral before breakfast      Vital Signs Last 24 Hrs  T(C): 36.7 (02 Jul 2022 04:00), Max: 36.9 (01 Jul 2022 17:43)  T(F): 98 (02 Jul 2022 04:00), Max: 98.4 (01 Jul 2022 17:43)  HR: 52 (02 Jul 2022 06:00) (48 - 87)  BP: 144/86 (02 Jul 2022 06:00) (100/68 - 144/86)  BP(mean): 106 (02 Jul 2022 06:00) (92 - 106)  RR: 15 (02 Jul 2022 06:00) (13 - 27)  SpO2: 97% (02 Jul 2022 06:00) (97% - 99%)      07-01 @ 07:01  -  07-02 @ 07:00  --------------------------------------------------------  IN:    Oral Fluid: 40 mL  Total IN: 40 mL    OUT:  Total OUT: 0 mL    Total NET: 40 mL          PHYSICAL EXAM:    GEN: Well-developed, well-nourished. NAD, awake and alert. No drooling or pooling of secretions. No stridor or stertor. Good vocal quality, no hoarseness. Phonating full sentences.   SKIN: Good color, non diaphoretic  HEENT: NC/AT; +improved edema to lower lips.  Oral mucosa pink and moist. No erythema or edema noted to buccal mucosa, tongue, FOM, uvula or posterior oropharynx. Uvula midline. + Posterior OP clear, improved edema to tonsil and uvula.   NECK:  Trachea midline. Neck supple, no TTP to B/L lateral neck, no cervical LAD.  RESP: No dyspnea, non-labored breathing. No use of accessory muscles.  CARDIO: +S1/S2  ABDO: Soft, NT.  EXT: MCKEON x 4    LABS:  CBC-                        15.4   5.75  )-----------( 194      ( 02 Jul 2022 05:05 )             44.2     BMP/CMP-  02 Jul 2022 05:05    136    |  96     |  9      ----------------------------<  125    4.6     |  24     |  0.8      Ca    10.1       02 Jul 2022 05:05  Mg     2.2       02 Jul 2022 05:05    TPro  8.7    /  Alb  5.3    /  TBili  0.6    /  DBili  x      /  AST  43     /  ALT  25     /  AlkPhos  57     01 Jul 2022 09:10    Coagulation Studies-    Endocrine Panel-  Calcium, Total Serum: 10.1 mg/dL (07-02 @ 05:05)  Calcium, Total Serum: 10.0 mg/dL (07-01 @ 09:10)              RADIOLOGY & ADDITIONAL STUDIES:

## 2022-07-02 NOTE — CHART NOTE - NSCHARTNOTEFT_GEN_A_CORE
ICU DOWNGRADE NOTE:    36y Male transferred to floor from ICU    Patient is a 36y old Male who presents with a chief complaint of Throat swelling (02 Jul 2022 08:22)    The patient is currently admitted for the primary diagnosis of Throat swelling      The patient was admitted to the unit for 1 Days.    The patient (was/was never) intubated for (days), and (was/was never) on pressors for (days).    Indwelling vascular catheters:    Urinary Catheter: n/a    Disposition: SDU    Code Status: Full    ICU COURSE OF EVENTS:  -------------------------------------------------------------------------------------------        -------------------------------------------------------------------------------------------    Current workup in progress:    SIGN OUT AT 07-02-22 @ 11:39 GIVEN TO: ICU DOWNGRADE NOTE:    36y Male transferred to floor from ICU    Patient is a 36y old Male who presents with a chief complaint of Throat swelling (02 Jul 2022 08:22)    The patient is currently admitted for the primary diagnosis of Throat swelling      The patient was admitted to the unit for 1 Days.    The patient was never intubated, and was never on pressors.    Indwelling vascular catheters:    Urinary Catheter: n/a    Disposition: SDU    Code Status: Full    ICU COURSE OF EVENTS:  -------------------------------------------------------------------------------------------        -------------------------------------------------------------------------------------------    Current workup in progress:    SIGN OUT AT 07-02-22 @ 11:39 GIVEN TO:

## 2022-07-02 NOTE — DISCHARGE NOTE PROVIDER - NSDCMRMEDTOKEN_GEN_ALL_CORE_FT
famotidine 20 mg oral tablet: 1 tab(s) orally 2 times a day  Humira 40 mg/0.8 mL subcutaneous kit:   predniSONE 20 mg oral tablet: 1 tab(s) orally once a day, As Needed

## 2022-07-02 NOTE — DISCHARGE NOTE PROVIDER - NSDCCPCAREPLAN_GEN_ALL_CORE_FT
PRINCIPAL DISCHARGE DIAGNOSIS  Diagnosis: Throat swelling  Assessment and Plan of Treatment: you came in for throat swelling. you were seen by the ent team. you had severe swelling. you were given IV steroids and you were recommended to have another dose but you are elaving against medical advise. please make sure to follow up with an allergen and your pcp as an outpatient.

## 2022-07-05 LAB
C1INH FUNCTIONAL FLD-MCNC: 97 — SIGNIFICANT CHANGE UP
C4 SERPL-MCNC: 32 MG/DL — SIGNIFICANT CHANGE UP (ref 13–39)
TRYPTASE SERPL-MCNC: 2.4 UG/L — SIGNIFICANT CHANGE UP (ref 2.2–13.2)

## 2022-07-08 DIAGNOSIS — R22.1 LOCALIZED SWELLING, MASS AND LUMP, NECK: ICD-10-CM

## 2022-07-08 DIAGNOSIS — Z53.29 PROCEDURE AND TREATMENT NOT CARRIED OUT BECAUSE OF PATIENT'S DECISION FOR OTHER REASONS: ICD-10-CM

## 2022-07-08 DIAGNOSIS — Z79.52 LONG TERM (CURRENT) USE OF SYSTEMIC STEROIDS: ICD-10-CM

## 2022-07-08 DIAGNOSIS — F12.90 CANNABIS USE, UNSPECIFIED, UNCOMPLICATED: ICD-10-CM

## 2022-07-08 DIAGNOSIS — Z79.899 OTHER LONG TERM (CURRENT) DRUG THERAPY: ICD-10-CM

## 2022-07-08 DIAGNOSIS — T78.3XXA ANGIONEUROTIC EDEMA, INITIAL ENCOUNTER: ICD-10-CM

## 2022-07-08 DIAGNOSIS — Z88.6 ALLERGY STATUS TO ANALGESIC AGENT: ICD-10-CM

## 2022-07-08 DIAGNOSIS — F10.10 ALCOHOL ABUSE, UNCOMPLICATED: ICD-10-CM

## 2022-07-08 DIAGNOSIS — M06.9 RHEUMATOID ARTHRITIS, UNSPECIFIED: ICD-10-CM

## 2022-08-01 NOTE — ED PROVIDER NOTE - NS ED MD DISPO DISCHARGE
has hx of alcohol abuse. mother reported that Pt has recently been drinking for 48 hrs. Pt denied drinking alcohol today. Per chart review, the Pt has had periods of binging but does not drink regularly.
Home

## 2022-08-03 ENCOUNTER — APPOINTMENT (OUTPATIENT)
Dept: RHEUMATOLOGY | Facility: CLINIC | Age: 37
End: 2022-08-03

## 2022-08-17 ENCOUNTER — APPOINTMENT (OUTPATIENT)
Dept: RHEUMATOLOGY | Facility: CLINIC | Age: 37
End: 2022-08-17

## 2022-08-17 VITALS
BODY MASS INDEX: 25.73 KG/M2 | HEART RATE: 73 BPM | WEIGHT: 190 LBS | SYSTOLIC BLOOD PRESSURE: 122 MMHG | OXYGEN SATURATION: 97 % | DIASTOLIC BLOOD PRESSURE: 84 MMHG | HEIGHT: 72 IN

## 2022-08-17 DIAGNOSIS — M19.90 UNSPECIFIED OSTEOARTHRITIS, UNSPECIFIED SITE: ICD-10-CM

## 2022-08-17 PROCEDURE — 99214 OFFICE O/P EST MOD 30 MIN: CPT

## 2022-08-17 NOTE — HISTORY OF PRESENT ILLNESS
[FreeTextEntry1] : 36 year old healthy male with a history of seronegative RA on Humira here for follow up.\par \par 8/17/22:\par Admitted at [facility] for ? anaphylaxis in july treated with steroids, epi, benadryl. He thinks its from shellfish. Reports joints are feeling well but he has continued episodes of swelling in his toes that come for 1-2 days, he takes prednisone and it resolves a few times a week. Also gets swelling on his arms and legs, lips. Plans to see allergy soon. No skin rashes. \par \par \par 12/30/21:\par He reports well circumscribed red raised and painful skin lesions that come and go last a few hours to a few days and resolve. He gets chin and lip swelling (showed me pictures). He takes prednisone and benadryl when this happens. He denies AM stiffness in joints. Sometimes gets left knee swelling. He gets migratory joint pains. He takes Humira without issues. \par \par 7/29/21:\par Patient reports swelling over various parts of his body including his wrists, feet, and buttock area that resolves after a few days. It's associated with pain.  He denies significant joint stiffness in the morning he has no joint swelling today. He reports being out of Humira as of late he doesn't remember the last injection. Denies skin rashes, visual problems, or fevers. Reports intermittent lip swelling without throat swelling or dyspnea, he takes a benadryl and motrin and symptoms resolve.\par \par Brief history:\par Patient reports persistent right 3rd PIP swelling and pain, right wrist pain and swelling, and new rash that just developed. It initially start on his left abdomen/flank and spread throughout his body. He has an appointment to see dermatology upcoming. Reports bilateral elbows lock up, knees are painful without swelling, and prolonged AM stiffness throughout. He is out of prednisone that his PCP prescribed for him and reports worsening pain. \par \par Denies enthesitis, dactylitis, uveitis, psoriasis, recent infection, inflammatory bowel disease or low back stiffness.\par \par

## 2022-08-17 NOTE — ASSESSMENT
[FreeTextEntry1] : Pleasant 36 year old male with a history of seronegative RA here for follow up.\par \par \par 1. Inflammatory arthritis/ seronegative, erosive RA\par -Negative MEENA, RF, CCP. Normal ESR and CRP in 9/2020 \par -X-ray of right foot, bilateral knees,  bilateral ankles negative in 2019\par -X-ray of bilateral knees and elbows negative. Bilateral hands show diffuse swelling of right third PIP in 8/2020 \par -Admitted 9/2020 for hand pain and right hand x-ray suggested worsening PIP swelling, erosion, and periostitis that are suggestive of psoriatic arthritis. Right wrist and elbow x-ray unrevealing\par -Symptoms respond to Humira and steroids\par -Labs reviewed\par -Continue Humira 40 mg SQ q 2 weeks\par -Start leflunomide 10 mg daily r/b/a discussed. Check CBC, CMP, ESR, CRP in1 month\par -F/u derm for rash\par -F/u allergy for lip sweling\par -F/u 3 months with labs \par \par \par \par \par \par

## 2022-08-17 NOTE — PHYSICAL EXAM
[General Appearance - Alert] : alert [General Appearance - In No Acute Distress] : in no acute distress [Sclera] : the sclera and conjunctiva were normal [PERRL With Normal Accommodation] : pupils were equal in size, round, and reactive to light [Extraocular Movements] : extraocular movements were intact [Outer Ear] : the ears and nose were normal in appearance [Oropharynx] : the oropharynx was normal [Neck Appearance] : the appearance of the neck was normal [Neck Cervical Mass (___cm)] : no neck mass was observed [Jugular Venous Distention Increased] : there was no jugular-venous distention [Thyroid Diffuse Enlargement] : the thyroid was not enlarged [Thyroid Nodule] : there were no palpable thyroid nodules [Auscultation Breath Sounds / Voice Sounds] : lungs were clear to auscultation bilaterally [Heart Rate And Rhythm] : heart rate was normal and rhythm regular [Heart Sounds] : normal S1 and S2 [Heart Sounds Gallop] : no gallops [Murmurs] : no murmurs [Heart Sounds Pericardial Friction Rub] : no pericardial rub [Bowel Sounds] : normal bowel sounds [Abdomen Soft] : soft [Abdomen Tenderness] : non-tender [] : no hepato-splenomegaly [Abdomen Mass (___ Cm)] : no abdominal mass palpated [Abnormal Walk] : normal gait [Nail Clubbing] : no clubbing  or cyanosis of the fingernails [Musculoskeletal - Swelling] : no joint swelling seen [Motor Tone] : muscle strength and tone were normal [FreeTextEntry1] : Rash over abdomen and scalp [Sensation] : the sensory exam was normal to light touch and pinprick [Motor Exam] : the motor exam was normal [Affect] : the affect was normal [Mood] : the mood was normal

## 2022-08-23 ENCOUNTER — RX RENEWAL (OUTPATIENT)
Age: 37
End: 2022-08-23

## 2022-12-01 NOTE — ED PROVIDER NOTE - PHYSICAL EXAMINATION
Constitutional: Well developed, well nourished. NAD. Good general hygiene  Extremities: Right foot tenderness on medial aspect of right foot overying bony prominence. No significant swelling in area.  Skin: No ecchymosis, no skin changes over foot.  MSK: Negative anterior and posterior drawer test of knee. Negative lachmann test of knee. Negative anterior drawer of ankle.  Neuro: Sensation equal, intact in b/l LE's. 5/5 strength throughout LE's. Paramedian Forehead Flap Text: A decision was made to reconstruct the defect utilizing an interpolation axial flap and a staged reconstruction.  A telfa template was made of the defect.  This telfa template was then used to outline the paramedian forehead pedicle flap.  The donor area for the pedicle flap was then injected with anesthesia.  The flap was excised through the skin and subcutaneous tissue down to the layer of the underlying musculature.  The pedicle flap was carefully excised within this deep plane to maintain its blood supply.  The edges of the donor site were undermined.   The donor site was closed in a primary fashion.  The pedicle was then rotated into position and sutured.  Once the tube was sutured into place, adequate blood supply was confirmed with blanching and refill.  The pedicle was then wrapped with xeroform gauze and dressed appropriately with a telfa and gauze bandage to ensure continued blood supply and protect the attached pedicle.

## 2023-01-11 NOTE — ED ADULT TRIAGE NOTE - CCCP TRG CHIEF CMPLNT
F/u for pt with combined hyperlipidemia and type 2 DM; had angioedema with statin therapy.  1-add fish oil 2 gms bid (if able should be specifically on VASCEPA)  2-add zetia 10 mg qd; will need PCSK9-I as outpatient  3-should go home on basal/bous insulin therapy; as outpatient would benefit from GLP-1 therapy.  4-also treat DM with metformin 850 mg bid and pioglitazone 30 mg qd I edited the note.   Time-based billing (NON-critical care).   70 minutes spent on total encounter; more than 50% of the visit was spent counseling and / or coordinating care by the attending physician.  The necessity of the time spent during the encounter on this date of service was due to: Coordination of care. IMPRESSION:    Hypertriglyceridemia-induced pancreatitis  Hypovolemic hyponatremia  Hepatic steatosis  Active smoker  HO alcohol use, pancreatitis    Impression and plan are my own. assault

## 2023-02-24 NOTE — ED ADULT NURSE NOTE - NS ED NOTE  TALK SOMEONE YN
No Patient and daughter educated on frequency of inpatient physical therapy at [facility], daughter verbalized understanding./3-5x/week

## 2023-04-19 ENCOUNTER — RX RENEWAL (OUTPATIENT)
Age: 38
End: 2023-04-19

## 2023-05-19 NOTE — ED ADULT NURSE NOTE - NSFALLRSKASSESASSIST_ED_ALL_ED
yes What Type Of Note Output Would You Prefer (Optional)?: Standard Output Hpi Title: Evaluation of Skin Lesions How Severe Are Your Spot(S)?: mild Have Your Spot(S) Been Treated In The Past?: has not been treated Location: Back and right thigh Year Removed: 2019

## 2023-06-13 ENCOUNTER — APPOINTMENT (OUTPATIENT)
Dept: RHEUMATOLOGY | Facility: CLINIC | Age: 38
End: 2023-06-13
Payer: MEDICAID

## 2023-06-13 VITALS
HEART RATE: 70 BPM | HEIGHT: 72 IN | SYSTOLIC BLOOD PRESSURE: 122 MMHG | BODY MASS INDEX: 25.06 KG/M2 | DIASTOLIC BLOOD PRESSURE: 80 MMHG | OXYGEN SATURATION: 96 % | WEIGHT: 185 LBS

## 2023-06-13 PROCEDURE — 99214 OFFICE O/P EST MOD 30 MIN: CPT

## 2023-06-13 NOTE — PHYSICAL EXAM
[General Appearance - Alert] : alert [General Appearance - In No Acute Distress] : in no acute distress [Sclera] : the sclera and conjunctiva were normal [PERRL With Normal Accommodation] : pupils were equal in size, round, and reactive to light [Extraocular Movements] : extraocular movements were intact [Oropharynx] : the oropharynx was normal [Outer Ear] : the ears and nose were normal in appearance [Neck Appearance] : the appearance of the neck was normal [Neck Cervical Mass (___cm)] : no neck mass was observed [Thyroid Diffuse Enlargement] : the thyroid was not enlarged [Jugular Venous Distention Increased] : there was no jugular-venous distention [Thyroid Nodule] : there were no palpable thyroid nodules [Auscultation Breath Sounds / Voice Sounds] : lungs were clear to auscultation bilaterally [Heart Rate And Rhythm] : heart rate was normal and rhythm regular [Heart Sounds] : normal S1 and S2 [Heart Sounds Gallop] : no gallops [Murmurs] : no murmurs [Heart Sounds Pericardial Friction Rub] : no pericardial rub [Bowel Sounds] : normal bowel sounds [Abdomen Tenderness] : non-tender [Abdomen Soft] : soft [] : no hepato-splenomegaly [Abdomen Mass (___ Cm)] : no abdominal mass palpated [Nail Clubbing] : no clubbing  or cyanosis of the fingernails [Abnormal Walk] : normal gait [Musculoskeletal - Swelling] : no joint swelling seen [Motor Tone] : muscle strength and tone were normal [Sensation] : the sensory exam was normal to light touch and pinprick [Motor Exam] : the motor exam was normal [Affect] : the affect was normal [Mood] : the mood was normal [FreeTextEntry1] : Rash over abdomen and scalp

## 2023-06-13 NOTE — ASSESSMENT
[FreeTextEntry1] : Pleasant 37 year old male with a history of seronegative RA here for follow up.\par \par \par 1. Inflammatory arthritis/ seronegative, erosive RA\par -Negative MEENA, RF, CCP. Normal ESR and CRP in 9/2020 \par -X-ray of right foot, bilateral knees,  bilateral ankles negative in 2019\par -X-ray of bilateral knees and elbows negative. Bilateral hands show diffuse swelling of right third PIP in 8/2020 \par -Admitted 9/2020 for hand pain and right hand x-ray suggested worsening PIP swelling, erosion, and periostitis that are suggestive of psoriatic arthritis. Right wrist and elbow x-ray unrevealing\par -Symptoms respond to Humira and steroids\par -Labs reviewed\par -Continue Humira 40 mg SQ q 2 weeks\par -Start leflunomide 10 mg daily r/b/a discussed. \par -Check CBC, CMP, TB, Hepatitis B and C, ESR, CRP in 1 month\par -F/u derm for rash\par -F/u allergy for lip swelling\par -F/u 3 months with labs \par \par \par \par \par \par

## 2023-06-13 NOTE — HISTORY OF PRESENT ILLNESS
[FreeTextEntry1] : 37 year old healthy male with a history of seronegative RA on Humira here for follow up.\par \par \par 6/13/23:\par He takes prednisone when his feet or hands swell up anywhere from 3x/week - 4x/ monthly. Swelling is accompanied by pain and stiffness. Swelling in the fingers can be just the 5th finger, involve 4th and 5th finger, involve the lateral hand or the entire hand. Swelling is random, no clear triggers. States he wakes up in the morning then all off a sudden swelling can start. He is eating shellfish, crabs, shrimp, lobster has no issues. He has a rash on his R side of chest for the last 1 year, never saw derm or allergy. He denies joint pain and joint stiffness. He never started Leflunomide\par \par 8/17/22:\par Admitted at [facility] for ? anaphylaxis in july treated with steroids, epi, benadryl. He thinks its from shellfish. Reports joints are feeling well but he has continued episodes of swelling in his toes that come for 1-2 days, he takes prednisone and it resolves a few times a week. Also gets swelling on his arms and legs, lips. Plans to see allergy soon. No skin rashes. \par \par \par 12/30/21:\par He reports well circumscribed red raised and painful skin lesions that come and go last a few hours to a few days and resolve. He gets chin and lip swelling (showed me pictures). He takes prednisone and benadryl when this happens. He denies AM stiffness in joints. Sometimes gets left knee swelling. He gets migratory joint pains. He takes Humira without issues. \par \par 7/29/21:\par Patient reports swelling over various parts of his body including his wrists, feet, and buttock area that resolves after a few days. It's associated with pain.  He denies significant joint stiffness in the morning he has no joint swelling today. He reports being out of Humira as of late he doesn't remember the last injection. Denies skin rashes, visual problems, or fevers. Reports intermittent lip swelling without throat swelling or dyspnea, he takes a benadryl and motrin and symptoms resolve.\par \par Brief history:\par Patient reports persistent right 3rd PIP swelling and pain, right wrist pain and swelling, and new rash that just developed. It initially start on his left abdomen/flank and spread throughout his body. He has an appointment to see dermatology upcoming. Reports bilateral elbows lock up, knees are painful without swelling, and prolonged AM stiffness throughout. He is out of prednisone that his PCP prescribed for him and reports worsening pain. \par \par Denies enthesitis, dactylitis, uveitis, psoriasis, recent infection, inflammatory bowel disease or low back stiffness.\par \par

## 2023-08-15 ENCOUNTER — RX RENEWAL (OUTPATIENT)
Age: 38
End: 2023-08-15

## 2023-08-21 NOTE — PATIENT PROFILE ADULT - FUNCTIONAL SCREEN CURRENT LEVEL: SWALLOWING (IF SCORE 2 OR MORE FOR ANY ITEM, CONSULT REHAB SERVICES), MLM)
0 = swallows foods/liquids without difficulty Azathioprine Counseling:  I discussed with the patient the risks of azathioprine including but not limited to myelosuppression, immunosuppression, hepatotoxicity, lymphoma, and infections.  The patient understands that monitoring is required including baseline LFTs, Creatinine, possible TPMP genotyping and weekly CBCs for the first month and then every 2 weeks thereafter.  The patient verbalized understanding of the proper use and possible adverse effects of azathioprine.  All of the patient's questions and concerns were addressed.

## 2023-09-26 NOTE — ED ADULT NURSE NOTE - CAS TRG GENERAL AIRWAY, MLM
[facility] Call Center    Phone Message    May a detailed message be left on voicemail: yes     Reason for Call: Other: Patient is calling on the status of her medications since she is out. Please call back when available.      Action Taken: Other: Pain    Travel Screening: Not Applicable                                                                    Patent

## 2024-02-06 ENCOUNTER — APPOINTMENT (OUTPATIENT)
Dept: RHEUMATOLOGY | Facility: CLINIC | Age: 39
End: 2024-02-06
Payer: MEDICAID

## 2024-02-06 VITALS
HEART RATE: 82 BPM | DIASTOLIC BLOOD PRESSURE: 80 MMHG | SYSTOLIC BLOOD PRESSURE: 120 MMHG | BODY MASS INDEX: 25.33 KG/M2 | OXYGEN SATURATION: 94 % | WEIGHT: 187 LBS | HEIGHT: 72 IN

## 2024-02-06 DIAGNOSIS — M06.041 RHEUMATOID ARTHRITIS W/OUT RHEUMATOID FACTOR, RIGHT HAND: ICD-10-CM

## 2024-02-06 DIAGNOSIS — M25.50 PAIN IN UNSPECIFIED JOINT: ICD-10-CM

## 2024-02-06 DIAGNOSIS — M06.042 RHEUMATOID ARTHRITIS W/OUT RHEUMATOID FACTOR, RIGHT HAND: ICD-10-CM

## 2024-02-06 DIAGNOSIS — R22.0 LOCALIZED SWELLING, MASS AND LUMP, HEAD: ICD-10-CM

## 2024-02-06 DIAGNOSIS — Z78.9 OTHER SPECIFIED HEALTH STATUS: ICD-10-CM

## 2024-02-06 PROCEDURE — 99215 OFFICE O/P EST HI 40 MIN: CPT

## 2024-02-06 NOTE — HISTORY OF PRESENT ILLNESS
[FreeTextEntry1] : Pt initially presented in 2020 with severe pain in his b/l knees and L ankle x 2 months forcing him to use crutches, which improved with prednisone. He then developed pain and swelling in his R 3rd PIP, and had a L hand x-ray which demonstrated a cortical erosion in the 3rd phalanx base, with concern for possible septic arthritis? Also + rash which was thought to be psoriasis? He was thought to have either psoriatic arthritis or seronegative RA. Pt was started on Humira, with significant improvement of his joint symptoms. However, he continues to experience episodes of sudden-onset swelling in one joint at a time, in his feet, hands. Also + episodes of angioedema, with perioral and periorbital edema. Pt also reports episodes of urticaria on his back and genitalia. He was referred to see an allergist by his PMD, has not seen one yet. He takes prednisone when he develops the swelling with significant improvement. He says that if he doesn't take prednisone, if he has swelling in his foot it starts spreading around his foot and sometimes also to the other foot. He takes prednisone approx 4 times per month. + Also episodes of tingling in his feet, for which he takes prednisone with improvement.  Physical exam: GEN: AAO man sitting on exam table in NAD SKIN: + Dry flaking patches noted on neck and chest, + Acne, no rashes currently MOUTH: Dry mucous membranes, no oral ulcers, normal oral aperture PULM: Clear to auscultation b/l CV: Regular rate and rhythm, no murmurs MSK: Shoulders: Full ROM b/l Elbows; Full ROM b/l, no effusions Wrists: Full ROM b/l, no effusions Hands: + Bony hypertrophy in R 3rd PIP with no TTP Hips: Full ROM b/l Knees: no effusions, full ROM b/l Ankles: no effusions, full ROM b/l Feet: no effusions, no TTP EXT: no nail changes, no LE edema b/l

## 2024-02-06 NOTE — ASSESSMENT
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 Previously with severe debilitating pain and stiffness in the ankles, knees, elbows, hands which improved significantly with Humira. Pt continues to experience episodes of swelling in one joint at a time in his LEs, as well as episodes of tingling in his feet which improve with prednisone - unclear if he can also have gout? Also with episodes of angioedema, urticaria and a rash on his neck and chest, not entirely clear whether these are related to his joint symptoms but they did not improve with Humira and were present prior to his starting Humira.  - Continue Humira 40 mg q 14 days - f/u quantiferon and hep B titers, not checked since 2022 - f/u repeat x-rays of hands to assess for progression of erosion - f/u labs for angioedema and urticaria. I also advised pt to see an allergist as well as a dermatologist for his rash, he was referred to see them by his PMD - Continue prn prednisone 20 mg for now  f/u in 3 months, will call pt with lab and x-ray results

## 2024-02-07 RX ORDER — ADALIMUMAB 40MG/0.4ML
40 KIT SUBCUTANEOUS
Qty: 1 | Refills: 2 | Status: DISCONTINUED | COMMUNITY
Start: 2023-06-14 | End: 2024-02-07

## 2024-02-07 RX ORDER — ADALIMUMAB 40MG/0.4ML
40 KIT SUBCUTANEOUS
Qty: 3 | Refills: 0 | Status: DISCONTINUED | COMMUNITY
Start: 2024-02-06 | End: 2024-02-07

## 2024-02-08 RX ORDER — ADALIMUMAB 40MG/0.4ML
40 KIT SUBCUTANEOUS
Qty: 1 | Refills: 3 | Status: DISCONTINUED | COMMUNITY
Start: 2024-02-07 | End: 2024-02-08

## 2024-02-20 ENCOUNTER — EMERGENCY (EMERGENCY)
Facility: HOSPITAL | Age: 39
LOS: 0 days | Discharge: HOME | End: 2024-02-20
Attending: STUDENT IN AN ORGANIZED HEALTH CARE EDUCATION/TRAINING PROGRAM
Payer: MEDICAID

## 2024-02-20 VITALS
SYSTOLIC BLOOD PRESSURE: 150 MMHG | HEART RATE: 60 BPM | DIASTOLIC BLOOD PRESSURE: 95 MMHG | RESPIRATION RATE: 16 BRPM | TEMPERATURE: 98 F | OXYGEN SATURATION: 99 %

## 2024-02-20 DIAGNOSIS — M06.9 RHEUMATOID ARTHRITIS, UNSPECIFIED: ICD-10-CM
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
## 2024-02-20 DIAGNOSIS — M79.674 PAIN IN RIGHT TOE(S): ICD-10-CM
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
## 2024-02-20 DIAGNOSIS — S90.211A CONTUSION OF RIGHT GREAT TOE WITH DAMAGE TO NAIL, INITIAL ENCOUNTER: ICD-10-CM

## 2024-02-20 DIAGNOSIS — Z88.6 ALLERGY STATUS TO ANALGESIC AGENT: ICD-10-CM

## 2024-02-20 PROCEDURE — 99284 EMERGENCY DEPT VISIT MOD MDM: CPT

## 2024-02-20 PROCEDURE — 73660 X-RAY EXAM OF TOE(S): CPT | Mod: RT

## 2024-02-20 PROCEDURE — 73660 X-RAY EXAM OF TOE(S): CPT | Mod: 26,RT

## 2024-02-20 PROCEDURE — 99283 EMERGENCY DEPT VISIT LOW MDM: CPT | Mod: 25

## 2024-02-20 RX ORDER — ACETAMINOPHEN 500 MG
975 TABLET ORAL ONCE
Refills: 0 | Status: COMPLETED | OUTPATIENT
Start: 2024-02-20 | End: 2024-02-20

## 2024-02-20 RX ADMIN — Medication 975 MILLIGRAM(S): at 13:07

## 2024-02-20 NOTE — ED PROVIDER NOTE - PATIENT PORTAL LINK FT
You can access the FollowMyHealth Patient Portal offered by [facility] by registering at the following website: [email or web address]. By joining [employer]’s FollowMyHealth portal, you will also be able to view your health information using other applications (apps) compatible with our system.

## 2024-02-20 NOTE — ED ADULT NURSE NOTE - NS_SISCREENINGSR_GEN_ALL_ED
Negative impairments found/functional limitations in following categories/risk reduction/prevention/rehab potential/therapy frequency/predicted duration of therapy intervention/anticipated equipment needs at discharge/anticipated discharge recommendation

## 2024-02-20 NOTE — ED PROVIDER NOTE - PHYSICAL EXAMINATION
GENERAL: Well-developed; well-nourished; in no acute distress.   SKIN: warm, dry, see EXT exam  CARD: 2/4 PT and DP pulses bilaterally. < 2 second capillary refill   EXT: R 1st toe distal phalanx TTP, edema, erythema, ecchymosis, painful ROM, cracked nail, no subungal hematoma  NEURO: Alert, oriented, sensation intact, 5/5 flexion/extension strength of 1st digit

## 2024-02-20 NOTE — ED PROVIDER NOTE - NSICDXNOPASTSURGICALHX_GEN_ALL_ED
Reports improvement in mood and less ups and downs. Admits she gets stressed worrying about BF who has mental health issues and younger sister who gets bullied at school. Denies SI, HI, or hallucinations. Problem: Self Harm/Suicidality  Goal: Will have no self-injury during hospital stay  Description: INTERVENTIONS:  1. Ensure constant observer at bedside with Q15M safety checks  2. Maintain a safe environment  3. Secure patient belongings  4. Ensure family/visitors adhere to safety recommendations  5. Ensure safety tray has been added to patient's diet order  6. Every shift and PRN: Re-assess suicidal risk via Frequent Screener    Outcome: Progressing     Problem: Anxiety  Goal: Will report anxiety at manageable levels  Description: INTERVENTIONS:  1. Administer medication as ordered  2. Teach and rehearse alternative coping skills  3. Provide emotional support with 1:1 interaction with staff  Outcome: Progressing  Flowsheets (Taken 1/6/2023 1913)  Will report anxiety at manageable levels:   Administer medication as ordered   Provide emotional support with 1:1 interaction with staff     Problem: Coping  Goal: Pt/Family able to verbalize concerns and demonstrate effective coping strategies  Description: INTERVENTIONS:  1. Assist patient/family to identify coping skills, available support systems and cultural and spiritual values  2. Provide emotional support, including active listening and acknowledgement of concerns of patient and caregivers  3. Reduce environmental stimuli, as able  4. Instruct patient/family in relaxation techniques, as appropriate  5.  Assess for spiritual pain/suffering and initiate Spiritual Care, Psychosocial Clinical Specialist consults as needed  Outcome: Progressing  Flowsheets (Taken 1/6/2023 1913)  Patient/family able to verbalize anxieties, fears, and concerns, and demonstrate effective coping:   Assist patient/family to identify coping skills, available support systems and cultural and spiritual values   Provide emotional support, including active listening and acknowledgement of concerns of patient and caregivers   Reduce environmental stimuli, as able     Problem: Decision Making  Goal: Pt/Family able to effectively weigh alternatives and participate in decision making related to treatment and care  Description: INTERVENTIONS:  1. Determine when there are differences between patient's view, family's view, and healthcare provider's view of condition  2. Facilitate patient and family articulation of goals for care  3. Help patient and family identify pros/cons of alternative solutions  4. Provide information as requested by patient/family  5. Respect patient/family right to receive or not to receive information  6. Serve as a liaison between patient and family and health care team  7. Initiate Consults from Ethics, Palliative Care or initiate [facility] as is appropriate  Outcome: Progressing  Flowsheets (Taken 1/6/2023 1913)  Patient/family able to effectively weigh alternatives and participate in decision making related to treatment and care: Facilitate patient and family articulation of goals for care   Help patient and family identify pros/cons of alternative solutions     Problem: Depression/Self Harm  Goal: Effect of psychiatric condition will be minimized and patient will be protected from self harm  Description: INTERVENTIONS:  1. Assess impact of patient's symptoms on level of functioning, self care needs and offer support as indicated  2. Assess patient/family knowledge of depression, impact on illness and need for teaching  3. Provide emotional support, presence and reassurance  4. Assess for possible suicidal thoughts or ideation. If patient expresses suicidal thoughts or statements do not leave alone, initiate Suicide Precautions, move to a room close to the nursing station and obtain sitter  5.  Initiate consults as appropriate with Mental Health Professional, Spiritual Care, Psychosocial CNS, and consider a recommendation to the LIP for a Psychiatric Consultation  Outcome: Progressing  Flowsheets  Taken 1/6/2023 2005  Effect of psychiatric condition will be minimized and patient will be protected from self harm:   Assess impact of patients symptoms on level of functioning, self care needs and offer support as indicated   Assess patient/family knowledge of depression, impact on illness and need for teaching   Provide emotional support, presence and reassurance  Taken 1/6/2023 1913  Effect of psychiatric condition will be minimized and patient will be protected from self harm:   Assess impact of patients symptoms on level of functioning, self care needs and offer support as indicated   Assess patient/family knowledge of depression, impact on illness and need for teaching   Provide emotional support, presence and reassurance     Problem: Involuntary Admit  Goal: Will cooperate with staff recommendations and doctor's orders and will demonstrate appropriate behavior  Description: INTERVENTIONS:  1. Treat underlying conditions and offer medication as ordered  2. Educate regarding involuntary admission procedures and rules  3.  Contain excessive/inappropriate behavior per unit and hospital policies  Outcome: Progressing  Flowsheets (Taken 1/6/2023 1913)  Will cooperate with staff recommendations and doctor's orders and will demonstrate appropriate behavior:   Treat underlying conditions and offer medication as ordered   Contain excessive/inappropriate behavior per unit and hospital policies <-- Click to add NO significant Past Surgical History

## 2024-02-20 NOTE — ED PROVIDER NOTE - CLINICAL SUMMARY MEDICAL DECISION MAKING FREE TEXT BOX
39 yo male, PMHx of EtOH misuse, RA on Humira, presents with right great toe pain after dropping a cough onto his foot, worse with touch, no alleviating factors, no associated symptoms.  Denies fevers, weakness, numbness, inability to ambulate, trauma elsewhere.  Medications given and effects reassessed.  Imaging ordered and reviewed. Discussed results with patient.  Patient feels improved.  Able to ambulate steadily without assistance.  Given return precautions and follow up outpatient.  Patient comfortable with plan. 37 yo male, PMHx of EtOH misuse, RA on Humira, presents with right great toe pain after dropping a cough onto his foot, worse with touch, no alleviating factors, no associated symptoms.  Cracked right great toe nail without discharge or bleeding or surrounding erythema.  Denies fevers, weakness, numbness, inability to ambulate, trauma elsewhere.  Medications given and effects reassessed.  Imaging ordered and reviewed. Discussed results with patient.  Patient feels improved.  Able to ambulate steadily without assistance.  Hard sole shoe given. Given return precautions and follow up outpatient.  Patient comfortable with plan.

## 2024-02-20 NOTE — ED PROVIDER NOTE - NSFOLLOWUPINSTRUCTIONS_ED_ALL_ED_FT
FOLLOW UP WITH YOUR DOCTOR THIS WEEK FOR REEVALUATION.     RETURN TO ED IMMEDIATELY WITH ANY WORSENING SYMPTOMS, CHEST PAIN, SHORTNESS OF BREATH, ABDOMINAL PAIN, FEVERS, WEAKNESS, DIZZINESS, PERSISTENT OR SEVERE HEADACHE OR ANY OTHER CONCERNS.    Musculoskeletal Pain    Musculoskeletal pain is muscle and roxana aches and pains. These pains can occur in any part of the body. Your caregiver may treat you without knowing the cause of the pain. They may treat you if blood or urine tests, X-rays, and other tests were normal.     CAUSES  There is often not a definite cause or reason for these pains. These pains may be caused by a type of germ (virus). The discomfort may also come from overuse. Overuse includes working out too hard when your body is not fit. Roxana aches also come from weather changes. Bone is sensitive to atmospheric pressure changes.    HOME CARE INSTRUCTIONS  Ask when your test results will be ready. Make sure you get your test results.  Only take over-the-counter or prescription medicines for pain, discomfort, or fever as directed by your caregiver. If you were given medications for your condition, do not drive, operate machinery or power tools, or sign legal documents for 24 hours. Do not drink alcohol. Do not take sleeping pills or other medications that may interfere with treatment.  Continue all activities unless the activities cause more pain. When the pain lessens, slowly resume normal activities. Gradually increase the intensity and duration of the activities or exercise.   During periods of severe pain, bed rest may be helpful. Lay or sit in any position that is comfortable.   Putting ice on the injured area.  Put ice in a bag.   Place a towel between your skin and the bag.  Leave the ice on for 15 to 20 minutes, 3 to 4 times a day.   Follow up with your caregiver for continued problems and no reason can be found for the pain. If the pain becomes worse or does not go away, it may be necessary to repeat tests or do additional testing. Your caregiver may need to look further for a possible cause.    SEEK IMMEDIATE MEDICAL CARE IF:  You have pain that is getting worse and is not relieved by medications.  You develop chest pain that is associated with shortness or breath, sweating, feeling sick to your stomach (nauseous), or throw up (vomit).  Your pain becomes localized to the abdomen.  You develop any new symptoms that seem different or that concern you.    MAKE SURE YOU:  Understand these instructions.   Will watch your condition.  Will get help right away if you are not doing well or get worse.    ADDITIONAL NOTES AND INSTRUCTIONS    Please follow up with your Primary MD in 24-48 hr.  Seek immediate medical care for any new/worsening signs or symptoms.

## 2024-02-20 NOTE — ED PROVIDER NOTE - OBJECTIVE STATEMENT
37 y/o male, with PMH of RA, presents to the ED with R great toe pain after dropping a couch on it last night. Has not taken anything for the pain. Able to ambulate with pain.

## 2024-02-20 NOTE — ED PROVIDER NOTE - CARE PROVIDER_API CALL
Carla Davenport  Nephrology  [address] [address]  Phone: [phone]  Fax: [phone]  Follow Up Time: Routine

## 2024-04-24 RX ORDER — ADALIMUMAB 40MG/0.4ML
40 KIT SUBCUTANEOUS
Qty: 3 | Refills: 0 | Status: ACTIVE | COMMUNITY
Start: 2024-02-08 | End: 1900-01-01

## 2024-05-27 ENCOUNTER — EMERGENCY (EMERGENCY)
Facility: HOSPITAL | Age: 39
LOS: 0 days | Discharge: HOME | End: 2024-05-28
Attending: EMERGENCY MEDICINE
Payer: MEDICAID

## 2024-05-27 VITALS
HEART RATE: 72 BPM | DIASTOLIC BLOOD PRESSURE: 104 MMHG | OXYGEN SATURATION: 97 % | TEMPERATURE: 98 F | RESPIRATION RATE: 18 BRPM | SYSTOLIC BLOOD PRESSURE: 161 MMHG

## 2024-05-27 VITALS
TEMPERATURE: 98 F | DIASTOLIC BLOOD PRESSURE: 84 MMHG | HEART RATE: 100 BPM | SYSTOLIC BLOOD PRESSURE: 148 MMHG | WEIGHT: 190.04 LBS | RESPIRATION RATE: 19 BRPM | OXYGEN SATURATION: 100 % | HEIGHT: 72 IN

## 2024-05-27 DIAGNOSIS — H11.33 CONJUNCTIVAL HEMORRHAGE, BILATERAL: ICD-10-CM
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
## 2024-05-27 DIAGNOSIS — H92.01 OTALGIA, RIGHT EAR: ICD-10-CM

## 2024-05-27 DIAGNOSIS — M25.511 PAIN IN RIGHT SHOULDER: ICD-10-CM

## 2024-05-27 DIAGNOSIS — Z88.6 ALLERGY STATUS TO ANALGESIC AGENT: ICD-10-CM

## 2024-05-27 DIAGNOSIS — S01.311A LACERATION WITHOUT FOREIGN BODY OF RIGHT EAR, INITIAL ENCOUNTER: ICD-10-CM

## 2024-05-27 DIAGNOSIS — Z23 ENCOUNTER FOR IMMUNIZATION: ICD-10-CM

## 2024-05-27 DIAGNOSIS — H57.12 OCULAR PAIN, LEFT EYE: ICD-10-CM
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
## 2024-05-27 PROCEDURE — 73030 X-RAY EXAM OF SHOULDER: CPT | Mod: RT

## 2024-05-27 PROCEDURE — 70486 CT MAXILLOFACIAL W/O DYE: CPT | Mod: MC

## 2024-05-27 PROCEDURE — 72125 CT NECK SPINE W/O DYE: CPT | Mod: 26,MC

## 2024-05-27 PROCEDURE — 70450 CT HEAD/BRAIN W/O DYE: CPT | Mod: 26,MC

## 2024-05-27 PROCEDURE — 90471 IMMUNIZATION ADMIN: CPT

## 2024-05-27 PROCEDURE — 70486 CT MAXILLOFACIAL W/O DYE: CPT | Mod: 26,MC

## 2024-05-27 PROCEDURE — 12011 RPR F/E/E/N/L/M 2.5 CM/<: CPT

## 2024-05-27 PROCEDURE — 82962 GLUCOSE BLOOD TEST: CPT

## 2024-05-27 PROCEDURE — 90715 TDAP VACCINE 7 YRS/> IM: CPT

## 2024-05-27 PROCEDURE — 73030 X-RAY EXAM OF SHOULDER: CPT | Mod: 26,RT

## 2024-05-27 PROCEDURE — 99285 EMERGENCY DEPT VISIT HI MDM: CPT | Mod: 25

## 2024-05-27 PROCEDURE — 99284 EMERGENCY DEPT VISIT MOD MDM: CPT | Mod: 25

## 2024-05-27 PROCEDURE — 70450 CT HEAD/BRAIN W/O DYE: CPT | Mod: MC

## 2024-05-27 PROCEDURE — 72125 CT NECK SPINE W/O DYE: CPT | Mod: MC

## 2024-05-27 RX ORDER — TETANUS TOXOID, REDUCED DIPHTHERIA TOXOID AND ACELLULAR PERTUSSIS VACCINE, ADSORBED 5; 2.5; 8; 8; 2.5 [IU]/.5ML; [IU]/.5ML; UG/.5ML; UG/.5ML; UG/.5ML
0.5 SUSPENSION INTRAMUSCULAR ONCE
Refills: 0 | Status: COMPLETED | OUTPATIENT
Start: 2024-05-27 | End: 2024-05-27

## 2024-05-27 RX ORDER — ACETAMINOPHEN 500 MG
975 TABLET ORAL ONCE
Refills: 0 | Status: COMPLETED | OUTPATIENT
Start: 2024-05-27 | End: 2024-05-27

## 2024-05-27 RX ADMIN — TETANUS TOXOID, REDUCED DIPHTHERIA TOXOID AND ACELLULAR PERTUSSIS VACCINE, ADSORBED 0.5 MILLILITER(S): 5; 2.5; 8; 8; 2.5 SUSPENSION INTRAMUSCULAR at 22:04

## 2024-05-27 RX ADMIN — Medication 975 MILLIGRAM(S): at 23:05

## 2024-05-27 RX ADMIN — Medication 975 MILLIGRAM(S): at 22:08
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
## 2024-05-27 NOTE — ED PROVIDER NOTE - PATIENT PORTAL LINK FT
You can access the FollowMyHealth Patient Portal offered by [facility] by registering at the following website: [email or web address]. By joining [employer]’s FollowMyHealth portal, you will also be able to view your health information using other applications (apps) compatible with our system.

## 2024-05-27 NOTE — ED PROVIDER NOTE - CARE PROVIDER_API CALL
Murali Landeros Franklin  Plastic Surgery  [address] [place] [address]  Phone: [phone]  Fax: [phone]  Follow Up Time: 1-3 Days
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
## 2024-05-27 NOTE — ED ADULT NURSE NOTE - PATIENT'S PREFERRED PRONOUN
----- Message from Sera Moeller NP sent at 6/15/2022  9:28 AM CDT -----  Labs are within normal limits however patient is overdue for follow-up appointment. Please have patient schedule appointment to receive continued treatment. Message sent through [employer].  Please note in chart Him/He

## 2024-05-27 NOTE — ED PROVIDER NOTE - PHYSICAL EXAMINATION
VITAL SIGNS: I have reviewed nursing notes and confirm.  CONSTITUTIONAL: + AOB, NAD   SKIN: no rash, no petechiae.   EYES: PERRL, EOMI w no pain, pink conjunctiva, anicteric, + b/l subconjunctival hemorrhage, soft globes, no proptosis, + L eyelid with ecchymosis, edema and ttp   ENT: tongue midline, no exudates, MMM, L ear unremarkable, + R ear with antitragus laceration w ttp, no active bleeding, no auricular hematoma, no mastoid tenderness   NECK: Supple; no meningismus  CARD: RRR, no murmurs, equal radial pulses bilaterally 2+  RESP: CTAB, no respiratory distress  ABD: Soft, non-tender, non-distended  EXT: No edema. No calves tenderness, b/l shoulders, elbow and wrist with FROM, NVI   NEURO: Alert, oriented. no focal deficits VITAL SIGNS: I have reviewed nursing notes and confirm.  CONSTITUTIONAL: + AOB, NAD   SKIN: no rash, no petechiae.   EYES: PERRL, EOMI w no pain, pink conjunctiva, anicteric, + b/l subconjunctival hemorrhage, soft globes, no proptosis, + L eyelid with ecchymosis, edema and ttp   ENT: tongue midline, no exudates, MMM, L ear unremarkable, + R ear with antitragus laceration extending into the nguyen/external canal w ttp, no active bleeding, no auricular hematoma, no mastoid tenderness   NECK: Supple; no meningismus  CARD: RRR, no murmurs, equal radial pulses bilaterally 2+  RESP: CTAB, no respiratory distress  ABD: Soft, non-tender, non-distended  EXT: No edema. No calves tenderness, b/l shoulders, elbow and wrist with FROM, NVI   NEURO: Alert, oriented. no focal deficits
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
## 2024-05-27 NOTE — ED PROVIDER NOTE - NSFOLLOWUPCLINICS_GEN_ALL_ED_FT
[facility]  ENT  [address], [address] [place] [address]  Phone: [phone]  Fax:   Follow Up Time: 1-3 Days
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
## 2024-05-28 NOTE — ED PROCEDURE NOTE - CPROC ED TIME OUT STATEMENT1
“Patient's name, , procedure and correct site were confirmed during the [place] Timeout.”
“Patient's name, , procedure and correct site were confirmed during the [place] Timeout.”

## 2024-06-04 ENCOUNTER — APPOINTMENT (OUTPATIENT)
Dept: OTOLARYNGOLOGY | Facility: CLINIC | Age: 39
End: 2024-06-04
Payer: MEDICAID

## 2024-06-04 VITALS — WEIGHT: 187 LBS | BODY MASS INDEX: 25.33 KG/M2 | HEIGHT: 72 IN

## 2024-06-04 DIAGNOSIS — S01.311D LACERATION W/OUT FOREIGN BODY OF RIGHT EAR, SUBSEQUENT ENCOUNTER: ICD-10-CM

## 2024-06-04 PROCEDURE — 99203 OFFICE O/P NEW LOW 30 MIN: CPT

## 2024-06-04 RX ORDER — PREDNISONE 20 MG/1
20 TABLET ORAL DAILY
Qty: 30 | Refills: 1 | Status: DISCONTINUED | COMMUNITY
Start: 2023-12-07 | End: 2024-06-04

## 2024-06-04 NOTE — ASSESSMENT
[FreeTextEntry1] : Recommend bacitracin to suture BID for 2 weeks.   Allow gentle bathing in the area.  Follow up in 1 month
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
## 2024-06-04 NOTE — PHYSICAL EXAM
[de-identified] : right ear lobule and choncha laceration well approximated with chromic suture and dermabond. No edema, erythema or purulence. [Midline] : trachea located in midline position [Normal] : palpation of lymph nodes is normal

## 2024-06-25 RX ORDER — PREDNISONE 20 MG/1
20 TABLET ORAL
Qty: 90 | Refills: 0 | Status: ACTIVE | COMMUNITY
Start: 2024-02-06 | End: 1900-01-01

## 2024-07-05 ENCOUNTER — RX RENEWAL (OUTPATIENT)
Age: 39
End: 2024-07-05

## 2024-07-17 ENCOUNTER — APPOINTMENT (OUTPATIENT)
Dept: OTOLARYNGOLOGY | Facility: CLINIC | Age: 39
End: 2024-07-17

## 2024-08-07 ENCOUNTER — RX RENEWAL (OUTPATIENT)
Age: 39
End: 2024-08-07

## 2024-08-13 ENCOUNTER — APPOINTMENT (OUTPATIENT)
Dept: OTOLARYNGOLOGY | Facility: CLINIC | Age: 39
End: 2024-08-13

## 2024-11-21 ENCOUNTER — RX RENEWAL (OUTPATIENT)
Age: 39
End: 2024-11-21

## 2025-08-22 ENCOUNTER — NON-APPOINTMENT (OUTPATIENT)
Age: 40
End: 2025-08-22

## 2025-08-22 RX ORDER — PREDNISONE 20 MG/1
20 TABLET ORAL
Qty: 90 | Refills: 1 | Status: ACTIVE | COMMUNITY
Start: 2025-08-22 | End: 1900-01-01

## 2025-09-09 ENCOUNTER — APPOINTMENT (OUTPATIENT)
Dept: RHEUMATOLOGY | Facility: CLINIC | Age: 40
End: 2025-09-09
Payer: MEDICAID

## 2025-09-09 VITALS
TEMPERATURE: 98.5 F | SYSTOLIC BLOOD PRESSURE: 133 MMHG | OXYGEN SATURATION: 96 % | WEIGHT: 182 LBS | DIASTOLIC BLOOD PRESSURE: 76 MMHG | HEART RATE: 72 BPM | HEIGHT: 72 IN | BODY MASS INDEX: 24.65 KG/M2

## 2025-09-09 DIAGNOSIS — R21 RASH AND OTHER NONSPECIFIC SKIN ERUPTION: ICD-10-CM

## 2025-09-09 DIAGNOSIS — R22.0 LOCALIZED SWELLING, MASS AND LUMP, HEAD: ICD-10-CM

## 2025-09-09 PROCEDURE — 99214 OFFICE O/P EST MOD 30 MIN: CPT

## 2025-09-09 RX ORDER — SELENIUM SULFIDE 25 MG/ML
2.5 LOTION TOPICAL
Qty: 1 | Refills: 0 | Status: DISCONTINUED | COMMUNITY
Start: 2025-09-09 | End: 2025-09-09

## 2025-09-09 RX ORDER — CLOTRIMAZOLE 10 MG/ML
1 SOLUTION TOPICAL TWICE DAILY
Qty: 1 | Refills: 1 | Status: ACTIVE | COMMUNITY
Start: 2025-09-09 | End: 1900-01-01

## 2025-09-09 RX ORDER — CETIRIZINE HYDROCHLORIDE 10 MG/1
10 TABLET, COATED ORAL DAILY
Qty: 90 | Refills: 1 | Status: ACTIVE | COMMUNITY
Start: 2025-09-09 | End: 1900-01-01